# Patient Record
Sex: FEMALE | Race: WHITE
[De-identification: names, ages, dates, MRNs, and addresses within clinical notes are randomized per-mention and may not be internally consistent; named-entity substitution may affect disease eponyms.]

---

## 2017-05-03 ENCOUNTER — HOSPITAL ENCOUNTER (INPATIENT)
Dept: HOSPITAL 17 - HOBED | Age: 37
LOS: 3 days | Discharge: HOME | End: 2017-05-06
Attending: OBSTETRICS & GYNECOLOGY | Admitting: OBSTETRICS & GYNECOLOGY
Payer: MEDICAID

## 2017-05-03 VITALS — RESPIRATION RATE: 18 BRPM

## 2017-05-03 DIAGNOSIS — B96.20: ICD-10-CM

## 2017-05-03 DIAGNOSIS — O32.4XX0: ICD-10-CM

## 2017-05-03 DIAGNOSIS — O34.211: Primary | ICD-10-CM

## 2017-05-03 DIAGNOSIS — Z3A.37: ICD-10-CM

## 2017-05-03 LAB
BASOPHILS # BLD AUTO: 0.1 TH/MM3 (ref 0–0.2)
BASOPHILS NFR BLD: 0.6 % (ref 0–2)
EOSINOPHIL # BLD: 0.2 TH/MM3 (ref 0–0.4)
EOSINOPHIL NFR BLD: 1.2 % (ref 0–4)
ERYTHROCYTE [DISTWIDTH] IN BLOOD BY AUTOMATED COUNT: 14.9 % (ref 11.6–17.2)
HCT VFR BLD CALC: 37.8 % (ref 35–46)
HEMO FLAGS: (no result)
LYMPHOCYTES # BLD AUTO: 2.4 TH/MM3 (ref 1–4.8)
LYMPHOCYTES NFR BLD AUTO: 14.7 % (ref 9–44)
MCH RBC QN AUTO: 24.8 PG (ref 27–34)
MCHC RBC AUTO-ENTMCNC: 32.8 % (ref 32–36)
MCV RBC AUTO: 75.8 FL (ref 80–100)
MONOCYTES NFR BLD: 4.8 % (ref 0–8)
NEUTROPHILS # BLD AUTO: 13.1 TH/MM3 (ref 1.8–7.7)
NEUTROPHILS NFR BLD AUTO: 78.7 % (ref 16–70)
PLATELET # BLD: 193 TH/MM3 (ref 150–450)
RBC # BLD AUTO: 4.99 MIL/MM3 (ref 4–5.3)
WBC # BLD AUTO: 16.6 TH/MM3 (ref 4–11)

## 2017-05-03 PROCEDURE — 86703 HIV-1/HIV-2 1 RESULT ANTBDY: CPT

## 2017-05-03 PROCEDURE — 99285 EMERGENCY DEPT VISIT HI MDM: CPT

## 2017-05-03 PROCEDURE — 87186 SC STD MICRODIL/AGAR DIL: CPT

## 2017-05-03 PROCEDURE — 59025 FETAL NON-STRESS TEST: CPT

## 2017-05-03 PROCEDURE — 87077 CULTURE AEROBIC IDENTIFY: CPT

## 2017-05-03 PROCEDURE — 90715 TDAP VACCINE 7 YRS/> IM: CPT

## 2017-05-03 PROCEDURE — 85025 COMPLETE CBC W/AUTO DIFF WBC: CPT

## 2017-05-03 PROCEDURE — 86850 RBC ANTIBODY SCREEN: CPT

## 2017-05-03 PROCEDURE — 80307 DRUG TEST PRSMV CHEM ANLYZR: CPT

## 2017-05-03 PROCEDURE — 81001 URINALYSIS AUTO W/SCOPE: CPT

## 2017-05-03 PROCEDURE — 87086 URINE CULTURE/COLONY COUNT: CPT

## 2017-05-03 PROCEDURE — 86762 RUBELLA ANTIBODY: CPT

## 2017-05-03 PROCEDURE — 86592 SYPHILIS TEST NON-TREP QUAL: CPT

## 2017-05-03 PROCEDURE — 80048 BASIC METABOLIC PNL TOTAL CA: CPT

## 2017-05-03 PROCEDURE — 86900 BLOOD TYPING SEROLOGIC ABO: CPT

## 2017-05-03 PROCEDURE — 86901 BLOOD TYPING SEROLOGIC RH(D): CPT

## 2017-05-03 PROCEDURE — 80074 ACUTE HEPATITIS PANEL: CPT

## 2017-05-03 NOTE — PD
HPI


Chief Complaint


Contractions


Date Seen:  May 3, 2017


Time Seen:  23:00


Travel History


International Travel<30 Days:  No


Contact w/Intl Traveler<30Days:  No


Known Affected Area:  No





History of Present Illness


HPI


Patient is a 37-year-old  who is at 37 weeks gestation by an unsure due 

date of May 24.  She saw Dr. tab by mouth for 2 visits but then never return 

for continued prenatal care she is not had an ultrasound in the past.  Patient 

has had a previous  section  for a breech presentation.


Para:  1


:  3


Miscarriage:  1





History


Past Medical History


Medical History:  Denies Significant Hx





Obstetric History


Obstetric History


 section in 


LEEP


D&C





Family History


Family History:  Negative





Social History


Alcohol Use:  No


Tobacco Use:  No


Substance Abuse:  No





Allergies-Medications


(Allergen,Severity, Reaction):  


Coded Allergies:  


     Adhesives (Verified  Allergy, Mild, 3/30/16)


Home Meds


Active Scripts


Sulfamethoxazole-Trimethoprim DS (Bactrim DS)1 Tab Tab1 Tab PO BID  #6 TAB


   Prov:Daina Loja MD         3/31/16


Sulfamethoxazole-Trimethoprim DS (Bactrim DS)1 Tab Tab1 Tab PO BID  7 Days


   Prov:Kiko Avendaño MD         3/23/16


Reported Medications


Hydroxyzine Pamoate (Vistaril)50 Mg Cap50 Mg PO Q6H PRN (ANXIETY)


   13


Sertraline HCl (Zoloft)50 Mg Key589 Mg PO DAILY 


   13


Trazodone HCl 100 Mg [id number] Mg PO HS  #45 TAB


   13


Divalproex  mg (Depakote  mg)500 Mg [id number] Mg PO BID 


   13





Review of Systems


Except as stated in HPI:  all other systems reviewed are Neg





Physical Exam


Narrative


GENERAL: Well-nourished, well-developed patient.


SKIN: Warm and dry.


HEAD: Normocephalic and atraumatic.


EYES: No scleral icterus. No injection or drainage. 


ENT: No nasal drainage noted. Mucous membranes pink. Airway patent.


NECK: Supple, trachea midline. No JVD.


CARDIOVASCULAR: Regular rate and rhythm without murmurs, gallops, or rubs. 


RESPIRATORY: Breath sounds equal bilaterally. No accessory muscle use.


BREASTS: Bilateral exam showed no masses , no retractions, no nipple discharge.


ABDOMEN/GI: Abdomen soft, non-tender, bowel sounds present, no rebound, no 

guarding 


   Gravid to [37-] weeks size


   Fundal Height: [-]


GENITOURINARY: 


   External Genitalia: intact and normal in appearance


   BUS glands: [-Normal]


   Cervix: [-]


   Dilatation: [8-]          


   Effacement: [-80]          


   Station: [-2]  


   Presentation: [-Vertex]        


   Membranes: [intact or ruptured] intact


   Uterine Contractions: [-] Every 5 minutes


FHT's: 


   Category: [1-]   


   Baseline: [-140]   


   Reactive: [Moderate-]   


   Variability: [-] Moderate  


   Decels: [-Absent]  


EXTREMITIES: No cyanosis or edema.


BACK: Nontender without obvious deformity. No CVA tenderness.


NEUROLOGICAL: Awake and alert. Motor and sensory grossly within normal limits. 

Five out of 5 muscle strength in all muscle groups. Normal speech.





Data


Data


Vital Signs Reviewed:  Yes





MDM


Plan


37-year-old  previous  section unknown incision type however it is 

most likely a low transverse as it was performed for a breech presentation at 

term


Discussed with patient the pros and cons of repeat  section versus 

vaginal delivery as she is a already 8 cm.


Patient understands 1% risk of uterine rupture with a previous  section


Diagnosis


Diagnosis:  


 Primary Impression:  


 37 weeks gestation of pregnancy


 Additional Impressions:  


 No prenatal care in current pregnancy


 Mother's group B Streptococcus colonization status unknown


 History of substance abuse








La Dumont MD May 3, 2017 23:05

## 2017-05-04 VITALS — OXYGEN SATURATION: 97 % | HEART RATE: 92 BPM

## 2017-05-04 VITALS — DIASTOLIC BLOOD PRESSURE: 76 MMHG | SYSTOLIC BLOOD PRESSURE: 153 MMHG | HEART RATE: 98 BPM

## 2017-05-04 VITALS — OXYGEN SATURATION: 98 % | HEART RATE: 96 BPM

## 2017-05-04 VITALS
OXYGEN SATURATION: 100 % | DIASTOLIC BLOOD PRESSURE: 90 MMHG | HEART RATE: 120 BPM | RESPIRATION RATE: 18 BRPM | SYSTOLIC BLOOD PRESSURE: 153 MMHG

## 2017-05-04 VITALS — OXYGEN SATURATION: 97 % | HEART RATE: 114 BPM

## 2017-05-04 VITALS — HEART RATE: 89 BPM | DIASTOLIC BLOOD PRESSURE: 66 MMHG | OXYGEN SATURATION: 97 % | SYSTOLIC BLOOD PRESSURE: 122 MMHG

## 2017-05-04 VITALS — OXYGEN SATURATION: 98 % | HEART RATE: 111 BPM

## 2017-05-04 VITALS — HEART RATE: 114 BPM | OXYGEN SATURATION: 98 %

## 2017-05-04 VITALS — HEART RATE: 108 BPM | SYSTOLIC BLOOD PRESSURE: 144 MMHG | OXYGEN SATURATION: 97 % | DIASTOLIC BLOOD PRESSURE: 77 MMHG

## 2017-05-04 VITALS — SYSTOLIC BLOOD PRESSURE: 162 MMHG | DIASTOLIC BLOOD PRESSURE: 89 MMHG | HEART RATE: 100 BPM

## 2017-05-04 VITALS — OXYGEN SATURATION: 98 % | HEART RATE: 115 BPM

## 2017-05-04 VITALS — HEART RATE: 99 BPM | SYSTOLIC BLOOD PRESSURE: 154 MMHG | DIASTOLIC BLOOD PRESSURE: 85 MMHG

## 2017-05-04 VITALS — SYSTOLIC BLOOD PRESSURE: 154 MMHG | HEART RATE: 115 BPM | OXYGEN SATURATION: 99 % | DIASTOLIC BLOOD PRESSURE: 86 MMHG

## 2017-05-04 VITALS — HEART RATE: 95 BPM | OXYGEN SATURATION: 96 %

## 2017-05-04 VITALS — OXYGEN SATURATION: 98 % | HEART RATE: 91 BPM

## 2017-05-04 VITALS — HEART RATE: 91 BPM | OXYGEN SATURATION: 97 %

## 2017-05-04 VITALS
RESPIRATION RATE: 18 BRPM | DIASTOLIC BLOOD PRESSURE: 82 MMHG | OXYGEN SATURATION: 100 % | HEART RATE: 110 BPM | SYSTOLIC BLOOD PRESSURE: 144 MMHG | TEMPERATURE: 97.8 F

## 2017-05-04 VITALS — OXYGEN SATURATION: 99 % | HEART RATE: 95 BPM

## 2017-05-04 VITALS — SYSTOLIC BLOOD PRESSURE: 117 MMHG | OXYGEN SATURATION: 98 % | DIASTOLIC BLOOD PRESSURE: 79 MMHG | HEART RATE: 110 BPM

## 2017-05-04 VITALS — OXYGEN SATURATION: 96 % | SYSTOLIC BLOOD PRESSURE: 152 MMHG | HEART RATE: 125 BPM | DIASTOLIC BLOOD PRESSURE: 98 MMHG

## 2017-05-04 VITALS — OXYGEN SATURATION: 99 % | HEART RATE: 108 BPM

## 2017-05-04 VITALS — HEART RATE: 99 BPM | OXYGEN SATURATION: 97 %

## 2017-05-04 VITALS — HEART RATE: 115 BPM | OXYGEN SATURATION: 98 %

## 2017-05-04 VITALS — HEART RATE: 109 BPM | OXYGEN SATURATION: 96 %

## 2017-05-04 VITALS — TEMPERATURE: 98.5 F | RESPIRATION RATE: 18 BRPM

## 2017-05-04 VITALS — OXYGEN SATURATION: 98 % | HEART RATE: 97 BPM

## 2017-05-04 VITALS — SYSTOLIC BLOOD PRESSURE: 136 MMHG | HEART RATE: 98 BPM | DIASTOLIC BLOOD PRESSURE: 68 MMHG | OXYGEN SATURATION: 96 %

## 2017-05-04 VITALS
HEART RATE: 105 BPM | SYSTOLIC BLOOD PRESSURE: 139 MMHG | OXYGEN SATURATION: 100 % | DIASTOLIC BLOOD PRESSURE: 77 MMHG | RESPIRATION RATE: 19 BRPM

## 2017-05-04 VITALS — OXYGEN SATURATION: 100 % | HEART RATE: 109 BPM

## 2017-05-04 VITALS — HEART RATE: 96 BPM | OXYGEN SATURATION: 100 %

## 2017-05-04 VITALS — SYSTOLIC BLOOD PRESSURE: 154 MMHG | DIASTOLIC BLOOD PRESSURE: 90 MMHG | HEART RATE: 107 BPM | OXYGEN SATURATION: 100 %

## 2017-05-04 VITALS — DIASTOLIC BLOOD PRESSURE: 87 MMHG | HEART RATE: 111 BPM | SYSTOLIC BLOOD PRESSURE: 148 MMHG | RESPIRATION RATE: 18 BRPM

## 2017-05-04 VITALS — HEART RATE: 97 BPM | OXYGEN SATURATION: 97 %

## 2017-05-04 VITALS — OXYGEN SATURATION: 100 % | HEART RATE: 112 BPM

## 2017-05-04 VITALS — OXYGEN SATURATION: 97 % | HEART RATE: 97 BPM

## 2017-05-04 VITALS — HEART RATE: 114 BPM | DIASTOLIC BLOOD PRESSURE: 70 MMHG | OXYGEN SATURATION: 99 % | SYSTOLIC BLOOD PRESSURE: 127 MMHG

## 2017-05-04 VITALS — HEART RATE: 97 BPM | OXYGEN SATURATION: 99 %

## 2017-05-04 VITALS — OXYGEN SATURATION: 100 % | HEART RATE: 95 BPM

## 2017-05-04 VITALS — HEART RATE: 76 BPM

## 2017-05-04 VITALS — OXYGEN SATURATION: 98 % | HEART RATE: 89 BPM

## 2017-05-04 VITALS — DIASTOLIC BLOOD PRESSURE: 81 MMHG | OXYGEN SATURATION: 99 % | SYSTOLIC BLOOD PRESSURE: 146 MMHG | HEART RATE: 107 BPM

## 2017-05-04 VITALS — HEART RATE: 92 BPM | OXYGEN SATURATION: 97 %

## 2017-05-04 VITALS — TEMPERATURE: 97.8 F | RESPIRATION RATE: 18 BRPM

## 2017-05-04 VITALS — HEART RATE: 110 BPM | OXYGEN SATURATION: 99 %

## 2017-05-04 VITALS — SYSTOLIC BLOOD PRESSURE: 138 MMHG | OXYGEN SATURATION: 100 % | DIASTOLIC BLOOD PRESSURE: 84 MMHG | HEART RATE: 106 BPM

## 2017-05-04 VITALS — HEART RATE: 112 BPM | OXYGEN SATURATION: 99 %

## 2017-05-04 VITALS — RESPIRATION RATE: 18 BRPM

## 2017-05-04 VITALS — OXYGEN SATURATION: 97 % | HEART RATE: 92 BPM | SYSTOLIC BLOOD PRESSURE: 130 MMHG | DIASTOLIC BLOOD PRESSURE: 78 MMHG

## 2017-05-04 VITALS — DIASTOLIC BLOOD PRESSURE: 76 MMHG | HEART RATE: 108 BPM | SYSTOLIC BLOOD PRESSURE: 139 MMHG

## 2017-05-04 VITALS — OXYGEN SATURATION: 99 % | HEART RATE: 129 BPM

## 2017-05-04 VITALS — OXYGEN SATURATION: 99 % | HEART RATE: 121 BPM

## 2017-05-04 VITALS — HEART RATE: 92 BPM | SYSTOLIC BLOOD PRESSURE: 124 MMHG | OXYGEN SATURATION: 98 % | DIASTOLIC BLOOD PRESSURE: 73 MMHG

## 2017-05-04 VITALS — HEART RATE: 104 BPM | OXYGEN SATURATION: 97 %

## 2017-05-04 VITALS — OXYGEN SATURATION: 96 % | HEART RATE: 98 BPM

## 2017-05-04 VITALS — HEART RATE: 100 BPM | OXYGEN SATURATION: 96 %

## 2017-05-04 VITALS — HEART RATE: 118 BPM | OXYGEN SATURATION: 100 % | DIASTOLIC BLOOD PRESSURE: 74 MMHG | SYSTOLIC BLOOD PRESSURE: 155 MMHG

## 2017-05-04 VITALS — OXYGEN SATURATION: 100 % | HEART RATE: 104 BPM

## 2017-05-04 VITALS — OXYGEN SATURATION: 100 % | HEART RATE: 118 BPM

## 2017-05-04 VITALS — HEART RATE: 101 BPM | OXYGEN SATURATION: 99 %

## 2017-05-04 VITALS — HEART RATE: 94 BPM | OXYGEN SATURATION: 98 %

## 2017-05-04 VITALS
RESPIRATION RATE: 18 BRPM | SYSTOLIC BLOOD PRESSURE: 153 MMHG | DIASTOLIC BLOOD PRESSURE: 88 MMHG | HEART RATE: 92 BPM | OXYGEN SATURATION: 93 %

## 2017-05-04 VITALS — OXYGEN SATURATION: 98 % | HEART RATE: 90 BPM

## 2017-05-04 VITALS — OXYGEN SATURATION: 96 % | HEART RATE: 100 BPM

## 2017-05-04 VITALS — HEART RATE: 111 BPM | SYSTOLIC BLOOD PRESSURE: 152 MMHG | DIASTOLIC BLOOD PRESSURE: 88 MMHG

## 2017-05-04 VITALS — OXYGEN SATURATION: 100 % | HEART RATE: 120 BPM

## 2017-05-04 VITALS — RESPIRATION RATE: 17 BRPM | TEMPERATURE: 98 F

## 2017-05-04 VITALS — OXYGEN SATURATION: 91 % | HEART RATE: 100 BPM

## 2017-05-04 VITALS — HEART RATE: 94 BPM | OXYGEN SATURATION: 97 %

## 2017-05-04 VITALS — HEART RATE: 95 BPM | OXYGEN SATURATION: 97 %

## 2017-05-04 VITALS — HEART RATE: 102 BPM | OXYGEN SATURATION: 97 %

## 2017-05-04 VITALS — HEART RATE: 98 BPM | OXYGEN SATURATION: 99 %

## 2017-05-04 VITALS — HEART RATE: 112 BPM | OXYGEN SATURATION: 98 %

## 2017-05-04 VITALS — HEART RATE: 90 BPM | OXYGEN SATURATION: 98 %

## 2017-05-04 VITALS — HEART RATE: 122 BPM | OXYGEN SATURATION: 100 %

## 2017-05-04 VITALS — HEART RATE: 94 BPM | DIASTOLIC BLOOD PRESSURE: 76 MMHG | OXYGEN SATURATION: 99 % | SYSTOLIC BLOOD PRESSURE: 137 MMHG

## 2017-05-04 VITALS — HEART RATE: 117 BPM | OXYGEN SATURATION: 100 %

## 2017-05-04 VITALS — HEART RATE: 90 BPM | OXYGEN SATURATION: 97 %

## 2017-05-04 VITALS — RESPIRATION RATE: 18 BRPM | HEART RATE: 106 BPM | OXYGEN SATURATION: 97 %

## 2017-05-04 VITALS — OXYGEN SATURATION: 99 % | HEART RATE: 103 BPM

## 2017-05-04 VITALS — DIASTOLIC BLOOD PRESSURE: 84 MMHG | HEART RATE: 110 BPM | SYSTOLIC BLOOD PRESSURE: 145 MMHG

## 2017-05-04 VITALS — HEART RATE: 109 BPM | OXYGEN SATURATION: 100 %

## 2017-05-04 VITALS — OXYGEN SATURATION: 97 % | HEART RATE: 111 BPM | DIASTOLIC BLOOD PRESSURE: 88 MMHG | SYSTOLIC BLOOD PRESSURE: 150 MMHG

## 2017-05-04 VITALS — HEART RATE: 115 BPM | OXYGEN SATURATION: 99 %

## 2017-05-04 VITALS
DIASTOLIC BLOOD PRESSURE: 76 MMHG | HEART RATE: 103 BPM | RESPIRATION RATE: 18 BRPM | OXYGEN SATURATION: 96 % | SYSTOLIC BLOOD PRESSURE: 139 MMHG

## 2017-05-04 VITALS — HEART RATE: 114 BPM | OXYGEN SATURATION: 100 %

## 2017-05-04 VITALS — HEART RATE: 95 BPM | OXYGEN SATURATION: 99 %

## 2017-05-04 VITALS — OXYGEN SATURATION: 97 % | HEART RATE: 90 BPM

## 2017-05-04 VITALS — HEART RATE: 106 BPM | OXYGEN SATURATION: 99 %

## 2017-05-04 VITALS — HEART RATE: 93 BPM | OXYGEN SATURATION: 98 %

## 2017-05-04 VITALS — OXYGEN SATURATION: 98 % | HEART RATE: 105 BPM

## 2017-05-04 VITALS — HEART RATE: 98 BPM | OXYGEN SATURATION: 100 %

## 2017-05-04 VITALS — HEART RATE: 99 BPM | OXYGEN SATURATION: 96 %

## 2017-05-04 VITALS — RESPIRATION RATE: 19 BRPM

## 2017-05-04 VITALS — SYSTOLIC BLOOD PRESSURE: 139 MMHG | DIASTOLIC BLOOD PRESSURE: 74 MMHG | HEART RATE: 102 BPM

## 2017-05-04 VITALS — DIASTOLIC BLOOD PRESSURE: 84 MMHG | HEART RATE: 101 BPM | SYSTOLIC BLOOD PRESSURE: 152 MMHG | OXYGEN SATURATION: 100 %

## 2017-05-04 VITALS — HEART RATE: 98 BPM | OXYGEN SATURATION: 96 %

## 2017-05-04 VITALS — OXYGEN SATURATION: 100 % | HEART RATE: 97 BPM

## 2017-05-04 VITALS — OXYGEN SATURATION: 100 % | HEART RATE: 106 BPM

## 2017-05-04 VITALS — HEART RATE: 96 BPM | OXYGEN SATURATION: 97 %

## 2017-05-04 VITALS — DIASTOLIC BLOOD PRESSURE: 84 MMHG | SYSTOLIC BLOOD PRESSURE: 152 MMHG | HEART RATE: 105 BPM | OXYGEN SATURATION: 99 %

## 2017-05-04 VITALS — HEART RATE: 92 BPM | DIASTOLIC BLOOD PRESSURE: 83 MMHG | SYSTOLIC BLOOD PRESSURE: 152 MMHG

## 2017-05-04 VITALS — OXYGEN SATURATION: 100 % | HEART RATE: 100 BPM

## 2017-05-04 VITALS — HEART RATE: 121 BPM | OXYGEN SATURATION: 98 %

## 2017-05-04 VITALS — OXYGEN SATURATION: 98 % | HEART RATE: 110 BPM

## 2017-05-04 VITALS — SYSTOLIC BLOOD PRESSURE: 152 MMHG | DIASTOLIC BLOOD PRESSURE: 77 MMHG | HEART RATE: 100 BPM

## 2017-05-04 VITALS — OXYGEN SATURATION: 97 % | HEART RATE: 101 BPM

## 2017-05-04 VITALS — OXYGEN SATURATION: 100 % | HEART RATE: 113 BPM

## 2017-05-04 VITALS — OXYGEN SATURATION: 100 %

## 2017-05-04 VITALS — SYSTOLIC BLOOD PRESSURE: 140 MMHG | HEART RATE: 96 BPM | DIASTOLIC BLOOD PRESSURE: 74 MMHG

## 2017-05-04 VITALS — OXYGEN SATURATION: 96 % | HEART RATE: 103 BPM

## 2017-05-04 VITALS — DIASTOLIC BLOOD PRESSURE: 70 MMHG | SYSTOLIC BLOOD PRESSURE: 121 MMHG | HEART RATE: 97 BPM

## 2017-05-04 VITALS — SYSTOLIC BLOOD PRESSURE: 151 MMHG | DIASTOLIC BLOOD PRESSURE: 92 MMHG | HEART RATE: 87 BPM

## 2017-05-04 VITALS — HEART RATE: 94 BPM | OXYGEN SATURATION: 99 %

## 2017-05-04 VITALS — DIASTOLIC BLOOD PRESSURE: 79 MMHG | SYSTOLIC BLOOD PRESSURE: 144 MMHG

## 2017-05-04 VITALS — HEART RATE: 104 BPM | OXYGEN SATURATION: 98 %

## 2017-05-04 VITALS — OXYGEN SATURATION: 100 % | HEART RATE: 115 BPM

## 2017-05-04 VITALS — OXYGEN SATURATION: 100 % | DIASTOLIC BLOOD PRESSURE: 95 MMHG | SYSTOLIC BLOOD PRESSURE: 159 MMHG | HEART RATE: 109 BPM

## 2017-05-04 VITALS — HEART RATE: 98 BPM | OXYGEN SATURATION: 98 %

## 2017-05-04 VITALS — OXYGEN SATURATION: 98 % | HEART RATE: 93 BPM

## 2017-05-04 VITALS — OXYGEN SATURATION: 97 %

## 2017-05-04 VITALS — HEART RATE: 95 BPM | OXYGEN SATURATION: 100 %

## 2017-05-04 VITALS — SYSTOLIC BLOOD PRESSURE: 155 MMHG | DIASTOLIC BLOOD PRESSURE: 97 MMHG

## 2017-05-04 VITALS — DIASTOLIC BLOOD PRESSURE: 93 MMHG | HEART RATE: 89 BPM | SYSTOLIC BLOOD PRESSURE: 151 MMHG

## 2017-05-04 VITALS — HEART RATE: 116 BPM | OXYGEN SATURATION: 97 %

## 2017-05-04 VITALS — OXYGEN SATURATION: 91 % | HEART RATE: 79 BPM

## 2017-05-04 VITALS — OXYGEN SATURATION: 100 % | HEART RATE: 114 BPM

## 2017-05-04 VITALS — HEART RATE: 102 BPM | OXYGEN SATURATION: 99 %

## 2017-05-04 VITALS — OXYGEN SATURATION: 96 % | HEART RATE: 99 BPM

## 2017-05-04 VITALS — OXYGEN SATURATION: 99 %

## 2017-05-04 VITALS — HEART RATE: 106 BPM | OXYGEN SATURATION: 97 %

## 2017-05-04 VITALS — RESPIRATION RATE: 17 BRPM

## 2017-05-04 VITALS — OXYGEN SATURATION: 100 % | HEART RATE: 105 BPM

## 2017-05-04 VITALS — OXYGEN SATURATION: 98 % | HEART RATE: 104 BPM

## 2017-05-04 LAB
AMPHETAMINE, URINE: (no result)
ANION GAP SERPL CALC-SCNC: 12 MEQ/L (ref 5–15)
BACTERIA #/AREA URNS HPF: (no result) /HPF
BARBITURATES, URINE: (no result)
BUN SERPL-MCNC: 8 MG/DL (ref 7–18)
CHLORIDE SERPL-SCNC: 102 MEQ/L (ref 98–107)
COCAINE UR-MCNC: (no result) NG/ML
COLOR UR: YELLOW
COMMENT (UR): (no result)
CULTURE IF INDICATED: (no result)
GFR SERPLBLD BASED ON 1.73 SQ M-ARVRAT: 85 ML/MIN (ref 89–?)
GLUCOSE UR STRIP-MCNC: (no result) MG/DL
HCO3 BLD-SCNC: 22.5 MEQ/L (ref 21–32)
HGB UR QL STRIP: (no result)
KETONES UR STRIP-MCNC: (no result) MG/DL
MUCOUS THREADS #/AREA URNS LPF: (no result) /LPF
NITRITE UR QL STRIP: (no result)
PLAT MORPH BLD: NORMAL
PLATELET BLD QL SMEAR: NORMAL
POTASSIUM SERPL-SCNC: 4.1 MEQ/L (ref 3.5–5.1)
RAPID PLASMA REAGIN SCREEN: (no result)
RBC MORPH BLD: NORMAL
RUBELLA STATUS: (no result)
RUBV IGG SER-ACNC: (no result) IU/ML (ref 10–500)
SCAN/DIFF: (no result)
SODIUM SERPL-SCNC: 136 MEQ/L (ref 136–145)
SP GR UR STRIP: 1.02 (ref 1–1.03)
SQUAMOUS #/AREA URNS HPF: 2 /HPF (ref 0–5)
TRANS CELLS #/AREA URNS HPF: <1 /HPF
URATE CRY #/AREA URNS HPF: (no result) /HPF

## 2017-05-04 RX ADMIN — SODIUM CITRATE AND CITRIC ACID MONOHYDRATE SCH ML: 500; 334 SOLUTION ORAL at 03:13

## 2017-05-04 RX ADMIN — SODIUM CITRATE AND CITRIC ACID MONOHYDRATE SCH ML: 500; 334 SOLUTION ORAL at 03:58

## 2017-05-04 NOTE — MP
cc:

TANA WEBSTER M.D.

****

 

 

DATE OF SURGERY

2017

 

PREOPERATIVE DIAGNOSES

1. History of substance abuse.

2. 37 weeks gestation.

3. No prenatal care.

4. GBS unknown.

5. Previous  section.

6. Failure to descend.

 

POSTOPERATIVE DIAGNOSES

1. History of substance abuse.

2. 37 weeks gestation.

3. No prenatal care.

4. GBS unknown.

5. Previous  section.

6. Failure to descend.

 

PROCEDURE

Repeat low transverse  section without extension.

 

ESTIMATED BLOOD LOSS

600 cc

 

ANESTHETIC

Epidural.

 

MEDICATIONS

Ancef given 2 grams preoperatively.

 

DRAINS

Hicks to gravity.

 

COUNTS

Correct x 3.

 

FINDINGS

1. Normal uterus, tubes, ovaries.

2. Clear amniotic fluid.

3. Infant female, vertex presentation.  Weight was 6 pounds, 15 ounces at 3150

     grams, born at 04:32.  Apgars were 8 and 9.

 

DESCRIPTION OF PROCEDURE

The patient was taken back to the operating room, prepped and draped in the

usual sterile fashion, placed in dorsal supine position with a wedge to her

left side.

 

After adequate anesthetic was administered, a Pfannenstiel incision was made

through her old scar and taken down to the fascia.  The fascia was nicked in

the midline and extended bilaterally, then taken off the rectus muscles.   The

muscles were divided in the midline.  The anterior peritoneum was entered.  The

vesicouterine peritoneum was taken down and a transverse hysterotomy incision

was made, bluntly extended bilaterally.  The infant's head was delivered to the

operative field, the rest of the body was delivered and handed off to the

resuscitation team after a 45-second cord clamping delay.  The placenta was

delivered intact spontaneously and the endometrial cavity was curetted with a

moistened laparotomy sponge.

 

The  hysterotomy incision was repaired using a running locking #1 chromic

suture with good hemostasis at repair.  The gutters were rendered free of all

blood and clot material.  The fascia was closed with a #1 PDS in a running

fashion.  The subcutaneous tissue was made hemostatic with a Bovie.  The skin

was closed with staples.

 

The patient tolerated the procedure well.  She was taken back to the recovery

room good condition.

 

 

                              _________________________________

                              MD CHIQUI Rodriguez/CORNELIUS

D:  2017/4:59 AM

T:  :54 AM

Visit #:  [id number]

Job #:  [id number]

## 2017-05-04 NOTE — PD.OB.DELI
Procedure Note


 Section Procedure


Pre Op Diagnosis:  


(1) History of substance abuse


(2) 37 weeks gestation of pregnancy


(3) Mother's group B Streptococcus colonization status unknown


(4) No prenatal care in current pregnancy


Post Op Diagnosis:  


Performed by


La Dumont


Procedure:  Repeat Low Transverse  Sec


Indication for delivery:  Desired elective repeat , Other (failure to 

descend)


Confirmed correct:  Patient, Procedure, Site, Time-out taken


Anesthesia:  Epidural


Medication prior to procedure:  Antacids, Antibiotics, IV


Urinary catheter:  Inserted using sterile technique


Sterile preparation:  Duraprep


Position:  Supine with wedge to left side





Operative Features


Skin Incision:  Pfannenstiel


Uterine Incision:  Low transverse w/knife / blunt ext


Membranes Ruptured:  Previously


Presentation:  Occiput anterior


Time of birth:  04:32


Delivery of infant:  Uneventful


Infant:  Female


One Minute APGAR:  8


Five Minute APGAR:  9


Birth Weight:  3150gms, 6#15oz


Status of infant:  Viable


Placenta delivered:  Intact


Estimated blood loss:  600cc


Procedure tolerated:  Well


Maternal Condition:  Stable


Fetal Condition:  Stable








La Dumont MD May 4, 2017 04:53

## 2017-05-04 NOTE — HHI.PR
OB/GYN Note


Note


Patient s/p epidural.  Cervix 8/c/-2, arom clear


, reactive, mod variability,category 1 tracing.








La Dumont MD May 4, 2017 02:34

## 2017-05-05 VITALS
HEART RATE: 86 BPM | SYSTOLIC BLOOD PRESSURE: 131 MMHG | TEMPERATURE: 98 F | DIASTOLIC BLOOD PRESSURE: 71 MMHG | RESPIRATION RATE: 18 BRPM

## 2017-05-05 VITALS — DIASTOLIC BLOOD PRESSURE: 70 MMHG | SYSTOLIC BLOOD PRESSURE: 145 MMHG | HEART RATE: 102 BPM

## 2017-05-05 VITALS — SYSTOLIC BLOOD PRESSURE: 119 MMHG | DIASTOLIC BLOOD PRESSURE: 70 MMHG | HEART RATE: 82 BPM

## 2017-05-05 VITALS
TEMPERATURE: 99 F | HEART RATE: 125 BPM | RESPIRATION RATE: 18 BRPM | SYSTOLIC BLOOD PRESSURE: 159 MMHG | DIASTOLIC BLOOD PRESSURE: 87 MMHG

## 2017-05-05 VITALS — SYSTOLIC BLOOD PRESSURE: 125 MMHG | DIASTOLIC BLOOD PRESSURE: 73 MMHG | HEART RATE: 81 BPM

## 2017-05-05 VITALS
DIASTOLIC BLOOD PRESSURE: 85 MMHG | TEMPERATURE: 98.5 F | SYSTOLIC BLOOD PRESSURE: 148 MMHG | RESPIRATION RATE: 18 BRPM | HEART RATE: 92 BPM | OXYGEN SATURATION: 100 %

## 2017-05-05 VITALS
HEART RATE: 106 BPM | DIASTOLIC BLOOD PRESSURE: 89 MMHG | RESPIRATION RATE: 18 BRPM | TEMPERATURE: 98.1 F | SYSTOLIC BLOOD PRESSURE: 157 MMHG

## 2017-05-05 VITALS — HEART RATE: 103 BPM | SYSTOLIC BLOOD PRESSURE: 155 MMHG | RESPIRATION RATE: 20 BRPM | DIASTOLIC BLOOD PRESSURE: 94 MMHG

## 2017-05-05 VITALS — HEART RATE: 77 BPM | SYSTOLIC BLOOD PRESSURE: 110 MMHG | DIASTOLIC BLOOD PRESSURE: 54 MMHG

## 2017-05-05 VITALS — HEART RATE: 98 BPM | SYSTOLIC BLOOD PRESSURE: 129 MMHG | DIASTOLIC BLOOD PRESSURE: 76 MMHG

## 2017-05-05 VITALS
SYSTOLIC BLOOD PRESSURE: 132 MMHG | DIASTOLIC BLOOD PRESSURE: 70 MMHG | HEART RATE: 104 BPM | RESPIRATION RATE: 18 BRPM | TEMPERATURE: 98.2 F

## 2017-05-05 VITALS — SYSTOLIC BLOOD PRESSURE: 112 MMHG | DIASTOLIC BLOOD PRESSURE: 50 MMHG | HEART RATE: 82 BPM

## 2017-05-05 VITALS
HEART RATE: 115 BPM | TEMPERATURE: 98.5 F | DIASTOLIC BLOOD PRESSURE: 90 MMHG | RESPIRATION RATE: 18 BRPM | SYSTOLIC BLOOD PRESSURE: 140 MMHG

## 2017-05-05 VITALS — RESPIRATION RATE: 20 BRPM | TEMPERATURE: 98.2 F

## 2017-05-05 VITALS — SYSTOLIC BLOOD PRESSURE: 128 MMHG | DIASTOLIC BLOOD PRESSURE: 68 MMHG | HEART RATE: 95 BPM

## 2017-05-05 RX ADMIN — IBUPROFEN PRN MG: 600 TABLET, FILM COATED ORAL at 17:04

## 2017-05-05 RX ADMIN — IBUPROFEN PRN MG: 600 TABLET, FILM COATED ORAL at 23:49

## 2017-05-05 RX ADMIN — OXYCODONE HYDROCHLORIDE AND ACETAMINOPHEN PRN TAB: 5; 325 TABLET ORAL at 21:07

## 2017-05-05 RX ADMIN — OXYCODONE HYDROCHLORIDE AND ACETAMINOPHEN PRN TAB: 5; 325 TABLET ORAL at 12:33

## 2017-05-05 RX ADMIN — IBUPROFEN PRN MG: 600 TABLET, FILM COATED ORAL at 08:52

## 2017-05-05 RX ADMIN — STANDARDIZED SENNA CONCENTRATE AND DOCUSATE SODIUM PRN TAB: 8.6; 5 TABLET, FILM COATED ORAL at 08:52

## 2017-05-05 RX ADMIN — OXYCODONE HYDROCHLORIDE AND ACETAMINOPHEN PRN TAB: 5; 325 TABLET ORAL at 17:04

## 2017-05-05 RX ADMIN — STANDARDIZED SENNA CONCENTRATE AND DOCUSATE SODIUM PRN TAB: 8.6; 5 TABLET, FILM COATED ORAL at 21:07

## 2017-05-05 NOTE — HHI.OB
Subjective


Post Operative Day:  1


Remarks


Postoperative day number 1. AFVSS overnight.  Pain well-controlled. Incision 

not draining. Decreased lochia.  Denies dysuria.  No breast tenderness.  

Appetite good.  No nausea or vomiting. Endorses flatus. No bowel movement.  

Ambulating well.  Denies calf pain, shortness of breath, or cough.  Otherwise, 

she is doing well this morning and has no other complaints.





Objective


Vitals/I&O





Vital Signs








  Date Time  Temp Pulse Resp B/P Pulse Ox O2 Delivery O2 Flow Rate FiO2


 


5/5/17 08:35   20     


 


5/5/17 08:35 98.2       


 


5/5/17 08:00  81  125/73    


 


5/5/17 07:00  77  110/54    


 


5/5/17 06:00  82  119/70    


 


5/5/17 05:00  82  112/50    


 


5/5/17 04:00 98.0 86  131/71    


 


5/5/17 04:00   18     


 


5/5/17 03:00  95  128/68    


 


5/5/17 02:00  98  129/76    


 


5/5/17 01:00  102  145/70    


 


5/5/17 00:00 98.2  18     


 


5/5/17 00:00  104  132/70    


 


5/4/17 23:00  96  140/74    


 


5/4/17 22:00  108  139/76    


 


5/4/17 21:30  114   100   


 


5/4/17 21:20  117   100   


 


5/4/17 21:15  121   99   


 


5/4/17 21:10  101   99   


 


5/4/17 21:05  106   100   


 


5/4/17 21:00  107      


 


5/4/17 21:00  109  146/81 99   


 


5/4/17 20:55  115   100   


 


5/4/17 20:50  118   100   


 


5/4/17 20:45  122   100   


 


5/4/17 20:40  113   100   


 


5/4/17 20:35  114   100   


 


5/4/17 20:30  104   100   


 


5/4/17 20:25  112   100   


 


5/4/17 20:20  109   100   


 


5/4/17 20:15  104   100   


 


5/4/17 20:10  114   100   


 


5/4/17 20:05  120   100   


 


5/4/17 20:00  123      


 


5/4/17 20:00  118  155/74 100   


 


5/4/17 19:55  109   100   


 


5/4/17 19:50  95   100   


 


5/4/17 19:45  97   100   


 


5/4/17 19:40  98   100   


 


5/4/17 19:35  95   100   


 


5/4/17 19:30  96   100   


 


5/4/17 19:25  95   99   


 


5/4/17 19:20  96   98   


 


5/4/17 19:15  93   98   


 


5/4/17 19:10  94   98   


 


5/4/17 19:05  97   98   


 


5/4/17 19:00  115      


 


5/4/17 19:00  106  138/84 100   


 


5/4/17 18:59   18     


 


5/4/17 18:55  98   99   


 


5/4/17 18:50  95   99   


 


5/4/17 18:45  94   99   


 


5/4/17 18:40  96   98   


 


5/4/17 18:30  104   98   


 


5/4/17 18:10  98   98   


 


5/4/17 18:05  97   99   


 


5/4/17 18:00  99  139/77 100   


 


5/4/17 18:00   19     


 


5/4/17 18:00  105      


 


5/4/17 17:55  108   99   


 


5/4/17 17:50  96   98   


 


5/4/17 17:45  103   99   


 


5/4/17 17:40  104      


 


5/4/17 17:40     98   


 


5/4/17 17:35  115   99   


 


5/4/17 17:30  112   99   


 


5/4/17 17:25  111   98   


 


5/4/17 17:20  96   98   


 


5/4/17 17:15  91   98   


 


5/4/17 17:10  110   99   


 


5/4/17 17:05  114   98   


 


5/4/17 17:00  112  127/70 99   


 


5/4/17 17:00  114      


 


5/4/17 16:53   18     


 


5/4/17 16:50  90   98   


 


5/4/17 16:00  97  121/70    


 


5/4/17 15:10  121   98   


 


5/4/17 15:05  95   97   


 


5/4/17 15:00  110      


 


5/4/17 15:00  108  117/79 98   


 


5/4/17 14:55  106   97   


 


5/4/17 14:54   17     


 


5/4/17 14:50  95   97   


 


5/4/17 14:45  95   97   


 


5/4/17 14:40  92   97   


 


5/4/17 14:35  90   97   


 


5/4/17 14:30  105   98   


 


5/4/17 14:25  92   97   


 


5/4/17 14:20  91   97   


 


5/4/17 14:15  91   97   


 


5/4/17 14:10  90   97   


 


5/4/17 14:05  110   98   


 


5/4/17 14:00  90  122/66 97   


 


5/4/17 14:00  89      


 


5/4/17 13:41   18     


 


5/4/17 13:41   18     


 


5/4/17 13:40  96      


 


5/4/17 13:40     97   


 


5/4/17 13:40     97   


 


5/4/17 13:40  96      


 


5/4/17 13:35     97   


 


5/4/17 13:35  90      


 


5/4/17 13:35  90      


 


5/4/17 13:35     97   


 


5/4/17 13:30  95      


 


5/4/17 13:30  92  130/78    


 


5/4/17 13:30  95      


 


5/4/17 13:30     97   


 


5/4/17 13:30    130/78    


 


5/4/17 13:30     97   


 


5/4/17 13:25  94   97   


 


5/4/17 13:25  94   97   


 


5/4/17 13:20  93   98   


 


5/4/17 13:20  93   98   


 


5/4/17 13:15  94   98   


 


5/4/17 13:15  94   98   


 


5/4/17 13:10  95   99   


 


5/4/17 13:10  95   99   


 


5/4/17 13:05  89   98   


 


5/4/17 13:05  89   98   


 


5/4/17 13:00  89  137/76 99   


 


5/4/17 13:00  94      


 


5/4/17 13:00  89  137/76 99   


 


5/4/17 13:00  94      


 


5/4/17 12:55  91   98   


 


5/4/17 12:55  91   98   


 


5/4/17 12:50  90   98   


 


5/4/17 12:50  90   98   


 


5/4/17 12:45  90   97   


 


5/4/17 12:45  90   97   


 


5/4/17 12:40  93   98   


 


5/4/17 12:40  93   98   


 


5/4/17 12:35  93   98   


 


5/4/17 12:35  93   98   


 


5/4/17 12:30  93  124/73 98   


 


5/4/17 12:30  93  124/73 98   


 


5/4/17 12:30  92      


 


5/4/17 12:27   19     


 


5/4/17 12:20  102   97   


 


5/4/17 12:15  100   91   


 


5/4/17 12:05  112   98   


 


5/4/17 12:00  98      


 


5/4/17 12:00  95  136/68 96   


 


5/4/17 11:55  95   96   


 


5/4/17 11:50  97   97   


 


5/4/17 11:45  114   98   


 


5/4/17 11:40  97   97   


 


5/4/17 11:35  99   97   


 


5/4/17 11:30  104  144/77 97   


 


5/4/17 11:30  108      


 


5/4/17 11:25  103   96   


 


5/4/17 11:20  99   96   


 


5/4/17 11:15   18     


 


5/4/17 11:15  106   97   


 


5/4/17 11:10  99   96   


 


5/4/17 11:05  98   96   


 


5/4/17 11:00   18     


 


5/4/17 11:00  98      


 


5/4/17 11:00  103  139/76 96   


 


5/4/17 10:55  104   97   


 


5/4/17 10:50  98   96   


 


5/4/17 10:45  100   96   


 


5/4/17 10:40  100   96   


 


5/4/17 10:35  115   98   


 


5/4/17 10:30  102  139/74    


 


5/4/17 10:00  100  152/77    


 


5/4/17 09:30  98  153/76    


 


5/4/17 09:15  129   99   


 


5/4/17 09:10  79   91   


 


5/4/17 09:05  100   100   








Result Diagram:  


5/3/17 2320                                                                    

            5/4/17 0530





Objective Remarks


GENERAL: Well-nourished, well-developed patient.


CARDIOVASCULAR: Regular rate and rhythm without murmurs, gallops, or rubs. 


RESPIRATORY: Breath sounds equal bilaterally. No accessory muscle use.


ABDOMEN/GI: Abdomen soft, non-tender, bowel sounds present. 


   Incision: Clean, dry and intact.


   Fundus: Firm, non-tender at umbilicus.


GENITOURINARY: Light to moderate bleeding.


EXTREMITIES: No cyanosis or edema, non-tender, without signs of DVT.


Medications and IVs





 Current Medications








 Medications


  (Trade)  Dose


 Ordered  Sig/Ege


 Route  Start Time


 Stop Time Status Last Admin


 


  (NS Flush)  2 ml  UNSCH  PRN


 IV FLUSH  5/4/17 05:00


    5/5/17 08:51


 


 


  (Tylenol)  650 mg  Q4H  PRN


 PO  5/4/17 05:00


     


 


 


  (Motrin)  600 mg  Q6H  PRN


 PO  5/4/17 05:00


    5/5/17 08:52


 


 


  (Americaine 20%


 Top Spr)  1 spray  Q4H  PRN


 TOPICAL  5/4/17 05:00


     


 


 


  (Tucks Pads)  1 applic  QID  PRN


 TOPICAL  5/4/17 05:00


    5/4/17 16:27


 


 


  (Zahraa-Colace)  2 tab  Q12H  PRN


 PO  5/4/17 05:00


    5/5/17 08:52


 


 


  (Ambien)  5 mg  HS  PRN


 PO  5/4/17 05:00


     


 


 


  (Mag-Al Plus


 Susp Liq)  15 ml  Q8H  PRN


 PO  5/4/17 05:00


     


 


 


 Ondansetron HCl 4


 mg  4 mg  Q6H  PRN


 PO  5/4/17 05:00


     


 


 


  (Lr 1000 ml Inj)  1,000 ml @ 


 75 mls/hr  [id number]


 IV  5/4/17 18:45


    5/4/17 18:57


 











Assessment/Plan


Assessment and Plan


36y/o female who is POD#1 s/p CXN. 


-Continue routine postpartum care.


-Percocet and Motrin PRN pain.


-Encouraged OOB. Advised pelvic rest for 6 wks. Will need a f/u appt. in 1 wk 

for incision check. 


-Re: birth ctrl, she is undecided


-D/c in 1-2 more days.    





dw OB attending








Naman Miguel MD R1 May 5, 2017 09:07

## 2017-05-06 VITALS
HEART RATE: 106 BPM | RESPIRATION RATE: 18 BRPM | SYSTOLIC BLOOD PRESSURE: 136 MMHG | TEMPERATURE: 98.1 F | DIASTOLIC BLOOD PRESSURE: 79 MMHG

## 2017-05-06 VITALS
SYSTOLIC BLOOD PRESSURE: 147 MMHG | RESPIRATION RATE: 20 BRPM | DIASTOLIC BLOOD PRESSURE: 79 MMHG | TEMPERATURE: 98.3 F | HEART RATE: 97 BPM

## 2017-05-06 RX ADMIN — OXYCODONE HYDROCHLORIDE AND ACETAMINOPHEN PRN TAB: 5; 325 TABLET ORAL at 02:28

## 2017-05-06 RX ADMIN — IBUPROFEN PRN MG: 600 TABLET, FILM COATED ORAL at 05:59

## 2017-05-06 NOTE — HHI.DCPOC
Discharge Care Plan


Diagnosis:  


(1) Postpartum care following  delivery


Report Symptoms to Your Doctor


-Temperate above 100.5 degrees


-Redness, of incision or excessive or foul smelling drainage


-Unusual pain or calf pain


-Increased vaginal bleeding


-Painful or difficulty urinating


-Feelings of extreme sadness or anxiety after 2 weeks


Goals to Promote Your Health


* To prevent worsening of your condition and complications


* To maintain your health at the optimal level


Directions to Meet Your Goals


*** Take your medications as prescribed


*** Follow your dietary instruction


*** Follow activity as directed


*** Ensure plenty of rest for recovery


*** Drink fluids for hydration








*** Keep your appointments as scheduled


*** Take your immunizations and boosters as scheduled


*** If your symptoms worsen call your PCP, if no PCP go to Urgent Care Center 

or Emergency Room***


*** Smoking is Dangerous to Your Health. Avoid second hand smoke***


***Call the 24-hour crisis hotline for domestic abuse at 1-136.263.6794***








Sky Larose MD R2 May 6, 2017 09:08

## 2017-05-06 NOTE — HHI.OB
Subjective


Post Operative Day:  2


Remarks


36 yo  who is POD 2 from CS  at 0432.Patient with improved BP overnight; 

SBP in 130's.  Pain well-controlled.  Decreased lochia.  Denies dysuria.  No 

breast tenderness.  Appetite good.  Patient passing gas normally. Ambulating 

well.  No shortness of breath or leg swelling. Patient states that she still 

needs to establish follow-up at this time for post-partum care. (Sky Larose MD R2)





Objective


Vitals/I&O





Vital Signs








  Date Time  Temp Pulse Resp B/P Pulse Ox O2 Delivery O2 Flow Rate FiO2


 


17 03:20 98.1 106 18 136/79    


 


17 23:50 98.1       


 


17 23:50  106 18 157/89    


 


17 20:00  92  148/85    


 


17 20:00 98.5  18  100   


 


17 17:14  103 20 155/94    


 


17 15:18 99.0 125 18 159/87    


 


17 12:13 98.5 115 18 140/90    





 (Sky Larose MD R2)


Result Diagram:  


5/3/17 2320                                                                    

            17 0530





Objective Remarks


GENERAL: Well-nourished, well-developed patient.


CARDIOVASCULAR: Regular rate and rhythm without murmurs, gallops, or rubs. 


RESPIRATORY: Breath sounds equal bilaterally. No accessory muscle use.


ABDOMEN/GI: Abdomen soft, non-tender, bowel sounds present. 


   Incision: No concerns for drainage or infection


   Fundus: Firm, non-tender at umbilicus.


GENITOURINARY: Light to moderate bleeding.


EXTREMITIES: No cyanosis or edema, non-tender, without signs of DVT.


Medications and IVs





 Current Medications








 Medications


  (Trade)  Dose


 Ordered  Sig/Gee


 Route  Start Time


 Stop Time Status Last Admin


 


  (NS Flush)  2 ml  UNSCH  PRN


 IV FLUSH  17 05:00


    17 08:51


 


 


  (Tylenol)  650 mg  Q4H  PRN


 PO  17 05:00


     


 


 


  (Motrin)  600 mg  Q6H  PRN


 PO  17 05:00


    17 05:59


 


 


  (Americaine 20%


 Top Spr)  1 spray  Q4H  PRN


 TOPICAL  17 05:00


     


 


 


  (Tucks Pads)  1 applic  QID  PRN


 TOPICAL  17 05:00


    17 16:27


 


 


  (Zahraa-Colace)  2 tab  Q12H  PRN


 PO  17 05:00


    17 21:07


 


 


  (Ambien)  5 mg  HS  PRN


 PO  17 05:00


     


 


 


  (Mag-Al Plus


 Susp Liq)  15 ml  Q8H  PRN


 PO  17 05:00


    17 02:30


 


 


 Ondansetron HCl 4


 mg  4 mg  Q6H  PRN


 PO  17 05:00


     


 


 


  (Lr 1000 ml Inj)  1,000 ml @ 


 75 mls/hr  [id number]


 IV  17 18:45


    17 18:57


 


 


  (Percocet  5-325


 Mg)  1 tab  Q4H  PRN


 PO  17 12:15


     


 


 


  (Percocet  5-325


 Mg)  2 tab  Q4H  PRN


 PO  17 12:15


    17 02:28


 


 


  (Procardia)  10 mg  Q8HR


 PO  17 16:00


    17 05:59


 


 


  (Keflex)  500 mg  BID


 PO  17 09:00


     


 





 (Sky Larose MD R2)





Assessment/Plan


Assessment and Plan


38y/o female who is POD#2 s/p CXN. 


-Continue routine postpartum care.


-Percocet and Motrin PRN pain.


-Encouraged OOB. Advised pelvic rest for 6 wks. Will need a f/u appt. in 1 wk 

for incision check. 


-Re: birth ctrl, she is undecided


-D/c today 





UTI


Impression: Gram - harish on urine culture


-Will treat with Macrobid 100mg BID





Maternal substance abuse 


-DCF notified (Sky Larose MD R2)





Collaborating MD Comments


Agree with management plan for discharge. (La Dumont MD)








Sky Larose MD R2 May 6, 2017 09:07


La Dumont MD May 6, 2017 09:20

## 2018-04-16 ENCOUNTER — HOSPITAL ENCOUNTER (OUTPATIENT)
Dept: HOSPITAL 17 - HPND | Age: 38
End: 2018-04-16
Attending: OBSTETRICS & GYNECOLOGY
Payer: MEDICAID

## 2018-04-16 DIAGNOSIS — O99.333: ICD-10-CM

## 2018-04-16 DIAGNOSIS — O98.513: ICD-10-CM

## 2018-04-16 DIAGNOSIS — O34.211: Primary | ICD-10-CM

## 2018-04-16 DIAGNOSIS — O09.523: ICD-10-CM

## 2018-04-16 PROCEDURE — 76811 OB US DETAILED SNGL FETUS: CPT

## 2018-05-05 ENCOUNTER — HOSPITAL ENCOUNTER (INPATIENT)
Dept: HOSPITAL 17 - HOBED | Age: 38
LOS: 2 days | Discharge: HOME | End: 2018-05-07
Attending: OBSTETRICS & GYNECOLOGY | Admitting: OBSTETRICS & GYNECOLOGY
Payer: MEDICAID

## 2018-05-05 VITALS — HEART RATE: 97 BPM

## 2018-05-05 VITALS — SYSTOLIC BLOOD PRESSURE: 140 MMHG | DIASTOLIC BLOOD PRESSURE: 72 MMHG | HEART RATE: 90 BPM

## 2018-05-05 VITALS — HEART RATE: 102 BPM

## 2018-05-05 VITALS — HEART RATE: 122 BPM

## 2018-05-05 VITALS — DIASTOLIC BLOOD PRESSURE: 50 MMHG | HEART RATE: 95 BPM | SYSTOLIC BLOOD PRESSURE: 136 MMHG

## 2018-05-05 VITALS — DIASTOLIC BLOOD PRESSURE: 103 MMHG | OXYGEN SATURATION: 100 % | SYSTOLIC BLOOD PRESSURE: 163 MMHG | HEART RATE: 98 BPM

## 2018-05-05 VITALS — HEART RATE: 104 BPM

## 2018-05-05 VITALS — HEART RATE: 106 BPM

## 2018-05-05 VITALS — HEART RATE: 95 BPM

## 2018-05-05 VITALS — BODY MASS INDEX: 34.19 KG/M2 | HEIGHT: 60 IN | WEIGHT: 174.17 LBS

## 2018-05-05 VITALS — HEART RATE: 101 BPM | SYSTOLIC BLOOD PRESSURE: 129 MMHG | DIASTOLIC BLOOD PRESSURE: 67 MMHG

## 2018-05-05 VITALS — HEART RATE: 108 BPM

## 2018-05-05 VITALS — DIASTOLIC BLOOD PRESSURE: 80 MMHG | HEART RATE: 117 BPM | SYSTOLIC BLOOD PRESSURE: 180 MMHG

## 2018-05-05 VITALS — HEART RATE: 105 BPM

## 2018-05-05 VITALS — HEART RATE: 98 BPM | OXYGEN SATURATION: 97 %

## 2018-05-05 VITALS — DIASTOLIC BLOOD PRESSURE: 78 MMHG | SYSTOLIC BLOOD PRESSURE: 146 MMHG | HEART RATE: 114 BPM

## 2018-05-05 VITALS — HEART RATE: 112 BPM

## 2018-05-05 VITALS — HEART RATE: 103 BPM | OXYGEN SATURATION: 99 %

## 2018-05-05 VITALS — HEART RATE: 100 BPM

## 2018-05-05 VITALS — HEART RATE: 92 BPM

## 2018-05-05 VITALS — SYSTOLIC BLOOD PRESSURE: 150 MMHG | HEART RATE: 113 BPM | DIASTOLIC BLOOD PRESSURE: 74 MMHG

## 2018-05-05 VITALS — HEART RATE: 96 BPM | OXYGEN SATURATION: 98 % | SYSTOLIC BLOOD PRESSURE: 144 MMHG | DIASTOLIC BLOOD PRESSURE: 67 MMHG

## 2018-05-05 VITALS — DIASTOLIC BLOOD PRESSURE: 60 MMHG | SYSTOLIC BLOOD PRESSURE: 143 MMHG | HEART RATE: 102 BPM

## 2018-05-05 VITALS — HEART RATE: 117 BPM

## 2018-05-05 VITALS — HEART RATE: 111 BPM

## 2018-05-05 VITALS — HEART RATE: 97 BPM | OXYGEN SATURATION: 97 % | SYSTOLIC BLOOD PRESSURE: 134 MMHG | DIASTOLIC BLOOD PRESSURE: 62 MMHG

## 2018-05-05 VITALS — HEART RATE: 85 BPM

## 2018-05-05 VITALS — SYSTOLIC BLOOD PRESSURE: 132 MMHG | DIASTOLIC BLOOD PRESSURE: 67 MMHG | HEART RATE: 97 BPM

## 2018-05-05 VITALS — HEART RATE: 94 BPM

## 2018-05-05 VITALS — RESPIRATION RATE: 15 BRPM | TEMPERATURE: 98.1 F

## 2018-05-05 VITALS — HEART RATE: 109 BPM

## 2018-05-05 VITALS — SYSTOLIC BLOOD PRESSURE: 131 MMHG | HEART RATE: 93 BPM | DIASTOLIC BLOOD PRESSURE: 73 MMHG

## 2018-05-05 VITALS — HEART RATE: 103 BPM

## 2018-05-05 VITALS — HEART RATE: 116 BPM

## 2018-05-05 VITALS — HEART RATE: 113 BPM

## 2018-05-05 VITALS — HEART RATE: 118 BPM

## 2018-05-05 VITALS — HEART RATE: 99 BPM

## 2018-05-05 VITALS — HEART RATE: 96 BPM

## 2018-05-05 VITALS — HEART RATE: 93 BPM

## 2018-05-05 DIAGNOSIS — K21.9: ICD-10-CM

## 2018-05-05 DIAGNOSIS — B19.20: ICD-10-CM

## 2018-05-05 DIAGNOSIS — Z3A.38: ICD-10-CM

## 2018-05-05 DIAGNOSIS — F11.10: ICD-10-CM

## 2018-05-05 DIAGNOSIS — Z23: ICD-10-CM

## 2018-05-05 DIAGNOSIS — F15.10: ICD-10-CM

## 2018-05-05 LAB
BASOPHILS # BLD AUTO: 0.1 TH/MM3 (ref 0–0.2)
BASOPHILS NFR BLD: 0.5 % (ref 0–2)
COLOR UR: YELLOW
EOSINOPHIL # BLD: 0.2 TH/MM3 (ref 0–0.4)
EOSINOPHIL NFR BLD: 1.4 % (ref 0–4)
ERYTHROCYTE [DISTWIDTH] IN BLOOD BY AUTOMATED COUNT: 15.1 % (ref 11.6–17.2)
GLUCOSE UR STRIP-MCNC: (no result) MG/DL
HCT VFR BLD CALC: 39.1 % (ref 35–46)
HGB BLD-MCNC: 12.8 GM/DL (ref 11.6–15.3)
HGB UR QL STRIP: (no result)
KETONES UR STRIP-MCNC: (no result) MG/DL
LYMPHOCYTES # BLD AUTO: 2 TH/MM3 (ref 1–4.8)
LYMPHOCYTES NFR BLD AUTO: 13.3 % (ref 9–44)
MCH RBC QN AUTO: 24.6 PG (ref 27–34)
MCHC RBC AUTO-ENTMCNC: 32.8 % (ref 32–36)
MCV RBC AUTO: 74.9 FL (ref 80–100)
MONOCYTE #: 0.7 TH/MM3 (ref 0–0.9)
MONOCYTES NFR BLD: 4.8 % (ref 0–8)
NEUTROPHILS # BLD AUTO: 12.4 TH/MM3 (ref 1.8–7.7)
NEUTROPHILS NFR BLD AUTO: 80 % (ref 16–70)
NITRITE UR QL STRIP: (no result)
PLATELET # BLD: 225 TH/MM3 (ref 150–450)
PMV BLD AUTO: 9.2 FL (ref 7–11)
RBC # BLD AUTO: 5.22 MIL/MM3 (ref 4–5.3)
SP GR UR STRIP: 1.02 (ref 1–1.03)
URINE LEUKOCYTE ESTERASE: (no result)
WBC # BLD AUTO: 15.5 TH/MM3 (ref 4–11)

## 2018-05-05 PROCEDURE — 86901 BLOOD TYPING SEROLOGIC RH(D): CPT

## 2018-05-05 PROCEDURE — 90715 TDAP VACCINE 7 YRS/> IM: CPT

## 2018-05-05 PROCEDURE — 85025 COMPLETE CBC W/AUTO DIFF WBC: CPT

## 2018-05-05 PROCEDURE — 87150 DNA/RNA AMPLIFIED PROBE: CPT

## 2018-05-05 PROCEDURE — 86850 RBC ANTIBODY SCREEN: CPT

## 2018-05-05 PROCEDURE — 81001 URINALYSIS AUTO W/SCOPE: CPT

## 2018-05-05 PROCEDURE — 86592 SYPHILIS TEST NON-TREP QUAL: CPT

## 2018-05-05 PROCEDURE — 86900 BLOOD TYPING SEROLOGIC ABO: CPT

## 2018-05-05 PROCEDURE — 59025 FETAL NON-STRESS TEST: CPT

## 2018-05-05 PROCEDURE — 80074 ACUTE HEPATITIS PANEL: CPT

## 2018-05-05 PROCEDURE — 36415 COLL VENOUS BLD VENIPUNCTURE: CPT

## 2018-05-05 PROCEDURE — 3E0R3BZ INTRODUCTION OF ANESTHETIC AGENT INTO SPINAL CANAL, PERCUTANEOUS APPROACH: ICD-10-PCS | Performed by: OBSTETRICS & GYNECOLOGY

## 2018-05-05 PROCEDURE — 87081 CULTURE SCREEN ONLY: CPT

## 2018-05-05 PROCEDURE — G0481 DRUG TEST DEF 8-14 CLASSES: HCPCS

## 2018-05-05 PROCEDURE — 86703 HIV-1/HIV-2 1 RESULT ANTBDY: CPT

## 2018-05-05 PROCEDURE — 87591 N.GONORRHOEAE DNA AMP PROB: CPT

## 2018-05-05 PROCEDURE — 00HU33Z INSERTION OF INFUSION DEVICE INTO SPINAL CANAL, PERCUTANEOUS APPROACH: ICD-10-PCS | Performed by: OBSTETRICS & GYNECOLOGY

## 2018-05-05 PROCEDURE — 87491 CHLMYD TRACH DNA AMP PROBE: CPT

## 2018-05-05 PROCEDURE — 80307 DRUG TEST PRSMV CHEM ANLYZR: CPT

## 2018-05-05 RX ADMIN — OXYTOCIN SCH MLS/HR: 10 INJECTION, SOLUTION INTRAMUSCULAR; INTRAVENOUS at 20:13

## 2018-05-05 RX ADMIN — OXYTOCIN SCH MLS/HR: 10 INJECTION, SOLUTION INTRAMUSCULAR; INTRAVENOUS at 21:37

## 2018-05-05 NOTE — PD
HPI


Chief Complaint


Contractions


Date Seen:  May 5, 2018


Time Seen:  20:19


Travel History


International Travel<30 Days:  No


Contact w/Intl Traveler<30Days:  No


Known Affected Area:  No





History of Present Illness


HPI


38-year-old  4 para 2 AB 1 at 38+ weeks gestation based on 32 week 

ultrasound who presents with contractions.  She is uncertain of her water is 

broken.  She reports some bloody vaginal mucus.  Her cervix on arrival was 8 cm 

dilated 100% effaced -2 station.  The patient desires a  if possible.  She 

has had 2 prior C-sections.  The first was for breech the second was 1 year ago 

for arrest of active labor at 8 cm.  I reviewed with the patient the risks 

benefits and alternatives to trial of labor after 2 C-sections especially 

emphasizing the increased risk of uterine rupture and she desires trial of 

labor.


Para:  2


:  4


Miscarriage:  1


:  0





History


Past Medical History


*** Narrative Medical


History of substance abuse but the patient denies any use during this pregnancy.





History of hepatitis C.





Obstetric History


Obstetric History


Her first pregnancy she had a  for breech


Second pregnancy was a miscarriage with D&C in the first trimester


Third pregnancy was a term trial of labor which arrested at 8 cm dilation and 

subsequently was her second 





She has had very limited prenatal care with this pregnancy.  She had an 

ultrasound at 32 weeks which established her EDC.  She has had no prenatal labs.





Past Surgical History


*** Narrative Surgical


D&C, 2 C-sections





Family History


Family History:  Negative





Social History


Alcohol Use:  No


Tobacco Use:  Yes (10 cigarettes per day)


Substance Abuse:  Yes (History of opiate and amphetamine abuse)





Allergies-Medications


(Allergen,Severity, Reaction):  


Coded Allergies:  


     adhesive (Unverified  Allergy, Mild, 8/15/17)


Home Meds


Active Scripts


Nitrofurantoin Macrocrystal (Nitrofurantoin Macrocrystal) 100 Mg Cap, 100 MG PO 

BID for Infection, #14 CAP 0 Refills


   Prov:Sky Larose MD R3         17


Nifedipine (Procardia) 10 Mg Cap, 10 MG PO Q8HR, #60 CAP


   Prov:Sky Larose MD R3         17


Sennosides-Docusate Sodium (Senna Plus 8.6-50 mg) 1 Tab Tab, 2 TAB PO Q12H Y 

for CONSTIPATION, #30 TAB


   Prov:Sky Larose MD R3         17


Oxycodone-Acetaminophen (Oxycodone-Acetaminophen) 5-325 mg Tab, 1 TAB PO Q4H Y 

for PAIN SCALE 3 TO 5, #20 TAB


   Prov:Sky Larose MD R3         17


Ibuprofen (Ibuprofen) 600 Mg Tab, 600 MG PO Q6H Y for POSTPARTUM CRAMPING, #30 

TAB


   Prov:Sky Larose MD R3         17


Reported Medications


Hydroxyzine Pamoate (Vistaril) 50 Mg Cap, 50 MG PO Q6H Y for ANXIETY, CAP


   13


Sertraline HCl (Zoloft) 50 Mg Tab, 150 MG PO DAILY, TAB


   13


Trazodone HCl (Trazodone HCl) 100 Mg Tab, 150 MG PO HS, #45 TAB


   13


Divalproex  mg (Depakote  mg) 500 Mg Tab, 500 MG PO BID, TAB


   13





Review of Systems


Except as stated in HPI:  all other systems reviewed are Neg





Physical Exam


Narrative


GENERAL: Well-nourished, well-developed patient.  Poor dentition


SKIN: Warm and dry.


HEAD: Normocephalic and atraumatic.


EYES: No scleral icterus. No injection or drainage. 


ENT: No nasal drainage noted. Mucous membranes pink. Airway patent.


NECK: Supple, trachea midline. No JVD.


CARDIOVASCULAR: Regular rate and rhythm without murmurs, gallops, or rubs. 


RESPIRATORY: Breath sounds equal bilaterally. No accessory muscle use.


ABDOMEN/GI: Abdomen soft, non-tender, bowel sounds present, no rebound, no 

guarding 


   Gravid to [-] weeks size


   Fundal Height: [35-]


GENITOURINARY: 


   External Genitalia: intact and normal in appearance


   BUS glands: [Bloody show-]


   Cervix: [-]


   Dilatation: [-8]          


   Effacement: [100]          


   Station: [-2-]  


   Presentation: [-Vertex]        


   Membranes: [intact ]


   Uterine Contractions: [Every 2-3-]


FHT's: 


   Category: [1-]   


   Baseline: [-]   


   Reactive: [Yes-]   


   Variability: [Moderate-]  


   Decels: [-No]  


EXTREMITIES: No cyanosis or edema.


BACK: Nontender without obvious deformity. No CVA tenderness.


NEUROLOGICAL: Awake and alert. Motor and sensory grossly within normal limits. 

Five out of 5 muscle strength in all muscle groups. Normal speech.





Data


Data


Vital Signs Reviewed:  Yes


Orders





 Orders


Ob (2e) Additional Admit Info (18 19:57)


Admit To Inpatient (18 )


Code Status (18 20:08)


Vital Signs (Adult) .Per protocol (18 20:08)


Fetal Heart (18 20:08)


Amnioinfusion (18 20:08)


Urinary Catheter Management .ONCE (18 20:08)


Diet Npo (18 Breakfast)


Lactated Ringer's 1000 Ml Inj (Lr 1000 M (18 20:08)


Lactated Ringer's 1000 Ml Inj (Lr 1000 M (18 20:08)


Sodium Chlorid 0.9% 500 Ml Inj (Ns 500 M (18 20:15)


Sodium Chlor 0.9% 1000 Ml Inj (Ns 1000 M (18 20:28)


Lidocaine 1% Inj (50 Ml) (Xylocaine 1% I (18 20:15)


Citric Acid-Sodium Citrate Liq (Bicitra (18 20:15)


Ondansetron Inj (Zofran Inj) (18 20:15)


Fentanyl Inj (Fentanyl Inj) (18 20:15)


Fentanyl Inj (Fentanyl Inj) (18 20:15)


Complete Blood Count With Diff (18 20:08)


Hold Clot (18 20:08)


Abo/Rh Blood Type (18 20:08)


Urinalysis - C+S If Indicated (18 20:08)


Drug Screen, Random Urine (18 20:08)


Type And Screen (18 20:08)


Rapid Plasma Regin (Rpr) W Ttr (18 20:08)


Hepatitis Profile (18 20:08)


No Prenatal Care Spec Serology (18 20:08)


Resp Oxygen Non Rebreathe Mask (18 )


^ Epidural / Intrathecal Infus (18 20:08)


Oxytocin 30 Units-500ml Premix (Pitocin (18 20:15)


Lidocaine 1% Inj (50 Ml) (Xylocaine 1% I (18 20:15)


Light Mineral Oil (Muri-Lube Oil) (18 20:15)


Inpatient Certification (18 )


Gc And Chlamydia Pcr (18 20:15)


Group B Strep Pcr (Rapid) (18 20:18)


Group B Strep Pcr (Rapid) (18 20:18)





MDM


Medical Record Reviewed:  Yes


Narrative Course / MDM


Assessment: 38-year-old multiparous female with 2 prior C-sections now in 

active labor and desiring trial of labor, #2 Limited prenatal care with dating 

by 32 week ultrasound, #3 history of polysubstance abuse, history of hepatitis C

, #4 unknown GBS status





Plan: Prenatal labs, admit for labor management,


Condition:  Good











Salo Martin MD May 5, 2018 20:30

## 2018-05-05 NOTE — HHI.HP
History & Physical


H&P


 


Patient Name: Jazmin Mcgill Unit Number: [id number]


YOB: 1980 Patient Status: Registered Emergency Room


Attending Doctor: Salo Martin MD Account Number: [id number]


   


 HPI 


HPI


Chief Complaint


Contractions


Date Seen:  May 5, 2018


Time Seen:  20:19


Travel History


International Travel<30 Days:  No


Contact w/Intl Traveler<30Days:  No


Known Affected Area:  No





History of Present Illness


HPI


38-year-old  4 para 2 AB 1 at 38+ weeks gestation based on 32 week 

ultrasound who presents with contractions.  She is uncertain of her water is 

broken.  She reports some bloody vaginal mucus.  Her cervix on arrival was 8 cm 

dilated 100% effaced -2 station.  The patient desires a  if possible.  She 

has had 2 prior C-sections.  The first was for breech the second was 1 year ago 

for arrest of active labor at 8 cm.  I reviewed with the patient the risks 

benefits and alternatives to trial of labor after 2 C-sections especially 

emphasizing the increased risk of uterine rupture and she desires trial of 

labor.


Para:  2


:  4


Miscarriage:  1


:  0





 History (Limited) 


History


Past Medical History


*** Narrative Medical


History of substance abuse but the patient denies any use during this pregnancy.





History of hepatitis C.





Obstetric History


Obstetric History


Her first pregnancy she had a  for breech


Second pregnancy was a miscarriage with D&C in the first trimester


Third pregnancy was a term trial of labor which arrested at 8 cm dilation and 

subsequently was her second 





She has had very limited prenatal care with this pregnancy.  She had an 

ultrasound at 32 weeks which established her EDC.  She has had no prenatal labs.





Past Surgical History


*** Narrative Surgical


D&C, 2 C-sections





Family History


Family History:  Negative





Social History


Alcohol Use:  No


Tobacco Use:  Yes (10 cigarettes per day)


Substance Abuse:  Yes (History of opiate and amphetamine abuse)





 Allergies-Medications 


Allergies-Medications


(Allergen,Severity, Reaction):  


Coded Allergies:  


     adhesive (Unverified  Allergy, Mild, 8/15/17)


Home Meds


Active Scripts


Nitrofurantoin Macrocrystal (Nitrofurantoin Macrocrystal) 100 Mg Cap, 100 MG PO 

BID for Infection, #14 CAP 0 Refills


   Prov:Sky Larose MD R3         17


Nifedipine (Procardia) 10 Mg Cap, 10 MG PO Q8HR, #60 CAP


   Prov:Sky Larose MD R3         17


Sennosides-Docusate Sodium (Senna Plus 8.6-50 mg) 1 Tab Tab, 2 TAB PO Q12H Y 

for CONSTIPATION, #30 TAB


   Prov:Sky Larose MD R3         17


Oxycodone-Acetaminophen (Oxycodone-Acetaminophen) 5-325 mg Tab, 1 TAB PO Q4H Y 

for PAIN SCALE 3 TO 5, #20 TAB


   Prov:Sky Larose MD R3         17


Ibuprofen (Ibuprofen) 600 Mg Tab, 600 MG PO Q6H Y for POSTPARTUM CRAMPING, #30 

TAB


   Prov:Sky Larose MD R3         17


Reported Medications


Hydroxyzine Pamoate (Vistaril) 50 Mg Cap, 50 MG PO Q6H Y for ANXIETY, CAP


   13


Sertraline HCl (Zoloft) 50 Mg Tab, 150 MG PO DAILY, TAB


   13


Trazodone HCl (Trazodone HCl) 100 Mg Tab, 150 MG PO HS, #45 TAB


   13


Divalproex  mg (Depakote  mg) 500 Mg Tab, 500 MG PO BID, TAB


   13





 ROS 


Review of Systems


Except as stated in HPI:  all other systems reviewed are Neg





 Physical Exam 


Physical Exam


Narrative


GENERAL: Well-nourished, well-developed patient.  Poor dentition


SKIN: Warm and dry.


HEAD: Normocephalic and atraumatic.


EYES: No scleral icterus. No injection or drainage. 


ENT: No nasal drainage noted. Mucous membranes pink. Airway patent.


NECK: Supple, trachea midline. No JVD.


CARDIOVASCULAR: Regular rate and rhythm without murmurs, gallops, or rubs. 


RESPIRATORY: Breath sounds equal bilaterally. No accessory muscle use.


ABDOMEN/GI: Abdomen soft, non-tender, bowel sounds present, no rebound, no 

guarding 


   Gravid to [-] weeks size


   Fundal Height: [35-]


GENITOURINARY: 


   External Genitalia: intact and normal in appearance


   BUS glands: [Bloody show-]


   Cervix: [-]


   Dilatation: [-8]          


   Effacement: [100]          


   Station: [-2-]  


   Presentation: [-Vertex]        


   Membranes: [intact ]


   Uterine Contractions: [Every 2-3-]


FHT's: 


   Category: [1-]   


   Baseline: [-]   


   Reactive: [Yes-]   


   Variability: [Moderate-]  


   Decels: [-No]  


EXTREMITIES: No cyanosis or edema.


BACK: Nontender without obvious deformity. No CVA tenderness.


NEUROLOGICAL: Awake and alert. Motor and sensory grossly within normal limits. 

Five out of 5 muscle strength in all muscle groups. Normal speech.





 Data 


Vital Signs Reviewed:  Yes


Orders





 Orders


Ob (2e) Additional Admit Info (18 19:57)


Admit To Inpatient (18 )


Code Status (18 20:08)


Vital Signs (Adult) .Per protocol (18 20:08)


Fetal Heart (18 20:08)


Amnioinfusion (18 20:08)


Urinary Catheter Management .ONCE (18 20:08)


Diet Npo (18 Breakfast)


Lactated Ringer's 1000 Ml Inj (Lr 1000 M (18 20:08)


Lactated Ringer's 1000 Ml Inj (Lr 1000 M (18 20:08)


Sodium Chlorid 0.9% 500 Ml Inj (Ns 500 M (18 20:15)


Sodium Chlor 0.9% 1000 Ml Inj (Ns 1000 M (18 20:28)


Lidocaine 1% Inj (50 Ml) (Xylocaine 1% I (18 20:15)


Citric Acid-Sodium Citrate Liq (Bicitra (18 20:15)


Ondansetron Inj (Zofran Inj) (18 20:15)


Fentanyl Inj (Fentanyl Inj) (18 20:15)


Fentanyl Inj (Fentanyl Inj) (18 20:15)


Complete Blood Count With Diff (18 20:08)


Hold Clot (18 20:08)


Abo/Rh Blood Type (18 20:08)


Urinalysis - C+S If Indicated (18 20:08)


Drug Screen, Random Urine (18 20:08)


Type And Screen (18 20:08)


Rapid Plasma Regin (Rpr) W Ttr (18 20:08)


Hepatitis Profile (18 20:08)


No Prenatal Care Spec Serology (18 20:08)


Resp Oxygen Non Rebreathe Mask (18 )


^ Epidural / Intrathecal Infus (18 20:08)


Oxytocin 30 Units-500ml Premix (Pitocin (18 20:15)


Lidocaine 1% Inj (50 Ml) (Xylocaine 1% I (18 20:15)


Light Mineral Oil (Muri-Lube Oil) (18 20:15)


Inpatient Certification (18 )


Gc And Chlamydia Pcr (18 20:15)


Group B Strep Pcr (Rapid) (18 20:18)


Group B Strep Pcr (Rapid) (18 20:18)





 MDM 


MDM


Medical Record Reviewed:  Yes


Narrative Course / MDM


Assessment: 38-year-old multiparous female with 2 prior C-sections now in 

active labor and desiring trial of labor, #2 Limited prenatal care with dating 

by 32 week ultrasound, #3 history of polysubstance abuse, history of hepatitis C

, #4 unknown GBS status





Plan: Prenatal labs, admit for labor management,


Condition:  Good











Salo Martin MD May 5, 2018 20:30











Salo Martin MD May 5, 2018 20:35

## 2018-05-06 VITALS
DIASTOLIC BLOOD PRESSURE: 75 MMHG | SYSTOLIC BLOOD PRESSURE: 142 MMHG | TEMPERATURE: 98.5 F | RESPIRATION RATE: 16 BRPM | HEART RATE: 115 BPM

## 2018-05-06 VITALS — DIASTOLIC BLOOD PRESSURE: 79 MMHG | HEART RATE: 108 BPM | SYSTOLIC BLOOD PRESSURE: 141 MMHG

## 2018-05-06 VITALS — HEART RATE: 107 BPM

## 2018-05-06 VITALS — DIASTOLIC BLOOD PRESSURE: 67 MMHG | HEART RATE: 100 BPM | SYSTOLIC BLOOD PRESSURE: 141 MMHG

## 2018-05-06 VITALS — HEART RATE: 115 BPM

## 2018-05-06 VITALS — HEART RATE: 111 BPM

## 2018-05-06 VITALS
SYSTOLIC BLOOD PRESSURE: 130 MMHG | DIASTOLIC BLOOD PRESSURE: 83 MMHG | RESPIRATION RATE: 17 BRPM | TEMPERATURE: 98.6 F | HEART RATE: 111 BPM

## 2018-05-06 VITALS — SYSTOLIC BLOOD PRESSURE: 144 MMHG | DIASTOLIC BLOOD PRESSURE: 72 MMHG | HEART RATE: 114 BPM

## 2018-05-06 VITALS — HEART RATE: 113 BPM

## 2018-05-06 VITALS — DIASTOLIC BLOOD PRESSURE: 77 MMHG | HEART RATE: 112 BPM | SYSTOLIC BLOOD PRESSURE: 144 MMHG

## 2018-05-06 VITALS — HEART RATE: 107 BPM | DIASTOLIC BLOOD PRESSURE: 83 MMHG | SYSTOLIC BLOOD PRESSURE: 139 MMHG

## 2018-05-06 VITALS — SYSTOLIC BLOOD PRESSURE: 145 MMHG | HEART RATE: 108 BPM | DIASTOLIC BLOOD PRESSURE: 82 MMHG

## 2018-05-06 VITALS — DIASTOLIC BLOOD PRESSURE: 81 MMHG | SYSTOLIC BLOOD PRESSURE: 144 MMHG | HEART RATE: 103 BPM

## 2018-05-06 VITALS — HEART RATE: 109 BPM

## 2018-05-06 VITALS — HEART RATE: 103 BPM | SYSTOLIC BLOOD PRESSURE: 132 MMHG | DIASTOLIC BLOOD PRESSURE: 62 MMHG

## 2018-05-06 VITALS — SYSTOLIC BLOOD PRESSURE: 134 MMHG | HEART RATE: 103 BPM | DIASTOLIC BLOOD PRESSURE: 66 MMHG

## 2018-05-06 VITALS — HEART RATE: 120 BPM | SYSTOLIC BLOOD PRESSURE: 132 MMHG | DIASTOLIC BLOOD PRESSURE: 78 MMHG

## 2018-05-06 VITALS — SYSTOLIC BLOOD PRESSURE: 145 MMHG | HEART RATE: 105 BPM | DIASTOLIC BLOOD PRESSURE: 59 MMHG

## 2018-05-06 VITALS — HEART RATE: 113 BPM | SYSTOLIC BLOOD PRESSURE: 117 MMHG | DIASTOLIC BLOOD PRESSURE: 64 MMHG

## 2018-05-06 VITALS — RESPIRATION RATE: 17 BRPM | TEMPERATURE: 97.7 F

## 2018-05-06 VITALS — HEART RATE: 114 BPM

## 2018-05-06 VITALS — HEART RATE: 103 BPM

## 2018-05-06 VITALS — HEART RATE: 118 BPM

## 2018-05-06 VITALS — HEART RATE: 102 BPM | DIASTOLIC BLOOD PRESSURE: 66 MMHG | SYSTOLIC BLOOD PRESSURE: 140 MMHG

## 2018-05-06 VITALS — HEART RATE: 98 BPM

## 2018-05-06 VITALS — HEART RATE: 110 BPM

## 2018-05-06 VITALS — SYSTOLIC BLOOD PRESSURE: 142 MMHG | HEART RATE: 112 BPM | TEMPERATURE: 98.1 F | DIASTOLIC BLOOD PRESSURE: 90 MMHG

## 2018-05-06 VITALS — HEART RATE: 116 BPM

## 2018-05-06 VITALS — HEART RATE: 104 BPM

## 2018-05-06 VITALS — HEART RATE: 97 BPM

## 2018-05-06 VITALS — HEART RATE: 108 BPM

## 2018-05-06 VITALS — RESPIRATION RATE: 16 BRPM

## 2018-05-06 VITALS — HEART RATE: 95 BPM

## 2018-05-06 VITALS — HEART RATE: 100 BPM

## 2018-05-06 VITALS — HEART RATE: 123 BPM

## 2018-05-06 VITALS — HEART RATE: 99 BPM

## 2018-05-06 VITALS — HEART RATE: 112 BPM

## 2018-05-06 VITALS — HEART RATE: 106 BPM

## 2018-05-06 VITALS — HEART RATE: 105 BPM

## 2018-05-06 PROCEDURE — 0HQ9XZZ REPAIR PERINEUM SKIN, EXTERNAL APPROACH: ICD-10-PCS | Performed by: OBSTETRICS & GYNECOLOGY

## 2018-05-06 RX ADMIN — STANDARDIZED SENNA CONCENTRATE AND DOCUSATE SODIUM PRN TAB: 8.6; 5 TABLET, FILM COATED ORAL at 09:53

## 2018-05-06 RX ADMIN — FAMOTIDINE SCH MG: 20 TABLET, FILM COATED ORAL at 09:53

## 2018-05-06 RX ADMIN — FAMOTIDINE SCH MG: 20 TABLET, FILM COATED ORAL at 21:09

## 2018-05-06 RX ADMIN — IBUPROFEN PRN MG: 800 TABLET, FILM COATED ORAL at 05:19

## 2018-05-06 RX ADMIN — ACETAMINOPHEN PRN MG: 325 TABLET ORAL at 21:16

## 2018-05-06 RX ADMIN — STANDARDIZED SENNA CONCENTRATE AND DOCUSATE SODIUM PRN TAB: 8.6; 5 TABLET, FILM COATED ORAL at 21:15

## 2018-05-06 RX ADMIN — IBUPROFEN PRN MG: 800 TABLET, FILM COATED ORAL at 21:15

## 2018-05-06 NOTE — PD.OB.DELI
Weeks gestation:  38


Gest age assessed date:  May 5, 2018


Gest age assessed time:  20:00


Pt started active labor?:  Yes


Active labor start date:  May 5, 2018


Active labor start time:  20:00


Medical induction of labor?:  No


Artificial rupture of membrane:  Yes


Artificial ROM date:  May 6, 2018


Artifical ROM time:  02:00


Anesthesia:  Epidural


Episiotomy:  None


Vaginal Delivery:  Vacuum, 


Presentation:  Occiput anterior


Nuchal Cord:  None


Delayed cord clamping (45 sec):  Yes


Infant:  Male


Delivery date:  May 6, 2018


Delivery time:  03:21


One Minute APGAR:  8


Five Minute APGAR:  9


Placenta:  Spontaneous delivery, Intact, 3 vessel cord


Laceration:  Vaginal laceration, 1 deg


Repair:  Vicryl interrupted


Estimated blood loss:  150


Additional Information


The patient progressed to complete dilation.  The vertex descended to +3 

station and no further progress was achieved.  I discussed with the patient the 

option of proceeding to repeat  versus trial of operative delivery 

with the vacuum.  After reviewing the risks benefits and alternatives she 

consented to trial of vacuum.  The vacuum was attached to the flex point in 

with a single contraction the vertex delivered.  The cup was removed and the 

shoulders were delivered by maternal effort without delay.  The remainder the 

infant followed easily and the baby was passed to the maternal abdomen were 

delayed cord clamping was accomplished.  There were no complications.











Slao Martin MD May 6, 2018 03:42

## 2018-05-06 NOTE — HHI.OB
Subjective


Post Partum Day:  0


Remarks


Patient is a 38-year-old  delivered at 38 weeks and 2 days. Patient is 

postpartum day 0 after vacuum-assisted vaginal delivery. Patient's pain is well-

controlled. Patient reports eating and drinking without any nausea or vomiting. 

Patient reports minimal bleeding. Patient has not urinated, passed gas or bowel 

movements. Patient is walking without lower extremity pain or shortness of 

breath. Patient reports desire for contraception with Depo-Provera and formula-

feeding.  She complains of some reflux.


 (Landau,Michael A MD R2)


Remarks


Patient seen and evaluated with resident under direct supervision, agree with 

assessment and plan.


 (Salo Martin MD)





Objective


Vitals/I&O





Vital Signs








  Date Time  Temp Pulse Resp B/P (MAP) Pulse Ox O2 Delivery O2 Flow Rate FiO2


 


18 04:45  108  145/82 (103)    


 


18 04:31  113  117/64 (81)    


 


18 04:20  107  139/83 (101)    


 


18 03:49 97.7  17     


 


18 03:30  112  144/77 (99)    


 


18 03:20  109      


 


18 03:15  114      


 


18 03:15  105      


 


/ 03:10  109      


 


18 03:05  97      


 


18 03:01  114  144/72 (96)    


 


18 03:00  123      


 


18 02:55  109      


 


18 02:45  106      


 


18 02:40  114      


 


18 02:35  115      


 


18 02:30  113      


 


18 02:25  111      


 


18 02:20  99      


 


18 02:15  110      


 


18 02:10  97      


 


18 02:05  114      


 


18 02:00  112  142/90 (107)    


 


18 02:00 98.1       


 


18 01:59   16     


 


18 01:55  112      


 


18 01:50  111      


 


18 01:45    132/78 (96)    


 


18 01:45  120      


 


18 01:40  109      


 


18 01:35  116      


 


18 01:30  108      


 


18 01:30    141/79 (99)    


 


18 01:25  114      


 


18 01:20  107      


 


18 01:16  103  134/66 (88)    


 


18 01:15  111      


 


18 01:10  104      


 


18 01:05  118      


 


18 01:01  103  144/81 (102)    


 


18 01:00  108      


 


18 00:55  103      


 


18 00:50  105      


 


18 00:45  97  141/67 (91)    


 


18 00:45  100      


 


18 00:40  98      


 


18 00:35  103      


 


18 00:31  102  140/66 (90)    


 


18 00:30  112      


 


18 00:25  104      


 


18 00:20  113      


 


18 00:15  105      


 


18 00:15  98  145/59 (87)    


 


18 00:10  95      


 


18 00:05  100      


 


18 00:00  103  132/62 (85)    


 


18 00:00  101      


 


18 23:55  103      


 


18 23:50  104      


 


18 23:46  95  136/50 (78)    


 


18 23:45  106      


 


/ 23:30  93      


 


18 23:30  90  140/72 (94)    


 


18 23:25  94      


 


18 23:20  106      


 


18 23:15  96      


 


5/18 23:10  118      


 


18 23:05  103      


 


18 23:00  101      


 


18 23:00    129/67 (87)    


 


18 22:55  97      


 


5/ 22:53 98.1  15     


 


5 22:50  93      


 


5/18 22:45  100      


 


/18 22:40  97      


 


/18 22:35  105      


 


5/18 22:30  108      


 


5/ 22:30  106      


 


5/18 22:20  103      


 


5/18 22:15  92      


 


5/18 22:10  95      


 


18 22:05  99      


 


18 22:00  97      


 


18 22:00  96  132/67 (88)    


 


18 21:50  104      


 


18 21:46  114  146/78 (100)    


 


18 21:45  109      


 


18 21:40  104      


 


18 21:35  98   97   


 


18 21:30  97      


 


18 21:30  97  134/62 (86)    


 


18 21:30     97   


 


18 21:25  96      


 


18 21:25  97  144/67 (92)    


 


18 21:25  101   98   


 


18 21:20  93  131/73 (92)    


 


18 21:20  99      


 


18 21:16  102  143/60 (87)    


 


18 21:15  103      


 


18 21:15     99   


 


18 21:11  113  150/74 (99)    


 


18 21:10  122      


 


18 21:06  117  180/80 (113)    


 


18 21:05  106      


 


18 21:01  108      


 


18 21:00  98      


 


18 21:00  105      


 


18 21:00     100   


 


18 21:00    163/103 (123)    


 


18 20:55  102      


 


18 20:50  108      


 


18 20:45  94      


 


18 20:40  97      


 


18 20:35  85      


 


18 20:30  102      


 


18 20:25  112      


 


18 20:15  111      


 


18 20:10  116      


 


18 20:05  113      


 


18 20:00  117      


 


18 19:55  111      


 


18 19:50  113      


 


18 19:45  112      








Objective Remarks


GENERAL: Well-nourished, well-developed patient.


CARDIOVASCULAR: Regular rate and rhythm without murmurs, gallops, or rubs. 


RESPIRATORY: Breath sounds equal bilaterally. No accessory muscle use.


ABDOMEN/GI: Abdomen soft, non-tender. 


   Fundus: Firm, non-tender at umbilicus.


GENITOURINARY: Light to moderate bleeding.


EXTREMITIES: No cyanosis or edema, non-tender, without signs of DVT.


Medications and IVs





Current Medications








 Medications


  (Trade)  Dose


 Ordered  Sig/Gee


 Route  Start Time


 Stop Time Status Last Admin


 


  (Misc Nursing


 Information)  No systemic


 narcotics to be


 given except...  UNSCH  PRN


 .XX  18 21:30


 18 21:29   


 


 


  (Misc Nursing


 Information)  DO NOT


 ADMINISTER ANY


 ANTICOAGUL...  UNSCH  PRN


 .XX  18 21:30


 18 21:29   


 


 


  (NS Flush)  2 ml  BID


 IV FLUSH  18 09:00


     


 


 


  (NS Flush)  2 ml  UNSCH  PRN


 IV FLUSH  18 03:45


     


 


 


 Oxytocin  500 ml @ 


 100 mls/hr  CONTINUOUS


 IV  18 03:45


 18 08:44   


 


 


  (Tylenol)  650 mg  Q4H  PRN


 PO  18 03:45


     


 


 


  (Motrin)  800 mg  Q8H  PRN


 PO  18 03:45


    18 05:19


 


 


  (Americaine 20%


 Top Spr)  1 spray  Q4H  PRN


 TOPICAL  18 03:45


     


 


 


  (Tucks Pads)  1 applic  QID  PRN


 TOPICAL  18 03:45


     


 


 


  (Zahraa-Colace)  2 tab  Q12H  PRN


 PO  18 03:45


     


 


 


  (Ambien)  5 mg  HS  PRN


 PO  18 03:45


     


 


 


  (M-M-R Ii Inj)  0.5 ml  ONCE ONCE


 SQ  18 16:00


 18 16:01   


 


 


  (Boostrix Inj)  0.5 ml  ONCE ONCE


 IM  18 16:00


 18 16:01   


 


 


  (Mag-Al Plus


 Susp Liq)  15 ml  Q8H  PRN


 PO  18 03:45


     


 


 


  (Zofran  Odt)  4 mg  Q6H  PRN


 PO  18 03:45


     


 


 


  (Toradol Inj)  30 mg  Q6H  PRN


 IM  18 03:45


 18 03:44   


 








 (Landau,Michael A MD R2)





Assessment/Plan


Problem List:  


(1) Vacuum extraction, delivered, current hospitalization


ICD Codes:  O66.5 - Attempted application of vacuum extractor and forceps


Assessment and Plan


Patient with h/o drug abuse is a 38-year-old  delivered at 38 weeks and 2 

days. Patient is postpartum day 0 after vacuum-assisted vaginal delivery. 

Patient was counseled to do 6 weeks of pelvic rest. Patient was counseled to 

follow up in 6 weeks. Patient requested follow-up and contraception. 





1. s/p vacuum-assisted vaginal delivery


--AF VSS


--Continue routine postpartum care


--Motrin and Percocet when necessary for pain


--Encourage OOB


--Pelvic rest for 6 weeks will need follow-up appointment at that time. 


--Contraception: Depo-Provera prior to discharge


--Anticipate discharge tomorrow or the next day





2.  Acid reflux


--Famotidine 20 mg p.o. twice daily





3. h/o drug abuse -patient reported that she has been clean for the past 4 

years but was found to have UDS positive for amphetamines





w/d/w Dr. Martin


Discharge Planning


--Anticipate discharge tomorrow or the next day


 (Landau,Michael A MD R2)











Landau,Michael A MD R2 May 6, 2018 08:15


Salo Martin MD May 6, 2018 09:37

## 2018-05-07 VITALS — RESPIRATION RATE: 20 BRPM | TEMPERATURE: 97.9 F | HEART RATE: 93 BPM

## 2018-05-07 VITALS — SYSTOLIC BLOOD PRESSURE: 116 MMHG | DIASTOLIC BLOOD PRESSURE: 66 MMHG

## 2018-05-07 RX ADMIN — ACETAMINOPHEN PRN MG: 325 TABLET ORAL at 12:20

## 2018-05-07 RX ADMIN — FAMOTIDINE SCH MG: 20 TABLET, FILM COATED ORAL at 09:00

## 2018-05-07 RX ADMIN — IBUPROFEN PRN MG: 800 TABLET, FILM COATED ORAL at 12:20

## 2018-05-07 NOTE — HHI.OB
Subjective


Remarks


Patient is a 38-year-old  delivered at 38 weeks and 2 days. Patient is 

postpartum day 1 after vacuum-assisted vaginal delivery. Patient's pain is well-

controlled. Patient reports eating and drinking without any nausea or vomiting. 

Patient reports minimal bleeding. Patient has passed gas, but no bowel 

movements. Patient is walking without lower extremity pain or shortness of 

breath. Patient reports desire for contraception with Depo-Provera and formula-

feeding.





Objective


Vitals/I&O





Vital Signs








  Date Time  Temp Pulse Resp B/P (MAP) Pulse Ox O2 Delivery O2 Flow Rate FiO2


 


18 07:55    116/66 (83)    


 


18 07:54 97.9 93 20     


 


18 21:10 98.6 111 17 130/83 (99)    


 


18 08:49 98.5 115 16     


 


18 08:49    142/75 (97)    








Objective Remarks


GENERAL: Well-nourished, well-developed patient.


CARDIOVASCULAR: Regular rate and rhythm without murmurs, gallops, or rubs. 


RESPIRATORY: Breath sounds equal bilaterally. No accessory muscle use.


ABDOMEN/GI: Abdomen soft, non-tender. 


   Fundus: Firm, non-tender at umbilicus.


GENITOURINARY: Light to moderate bleeding.


EXTREMITIES: No cyanosis or edema, non-tender, without signs of DVT.


Medications and IVs





Current Medications








 Medications


  (Trade)  Dose


 Ordered  Sig/Gee


 Route  Start Time


 Stop Time Status Last Admin


 


  (NS Flush)  2 ml  BID


 IV FLUSH  18 09:00


     


 


 


  (NS Flush)  2 ml  UNSCH  PRN


 IV FLUSH  18 03:45


     


 


 


  (Tylenol)  650 mg  Q4H  PRN


 PO  18 03:45


    18 21:16


 


 


  (Motrin)  800 mg  Q8H  PRN


 PO  18 03:45


    18 21:15


 


 


  (Americaine 20%


 Top Spr)  1 spray  Q4H  PRN


 TOPICAL  18 03:45


     


 


 


  (Tucks Pads)  1 applic  QID  PRN


 TOPICAL  18 03:45


     


 


 


  (Zahraa-Colace)  2 tab  Q12H  PRN


 PO  18 03:45


    18 21:15


 


 


  (Ambien)  5 mg  HS  PRN


 PO  18 03:45


     


 


 


  (Mag-Al Plus


 Susp Liq)  15 ml  Q8H  PRN


 PO  18 03:45


     


 


 


  (Zofran  Odt)  4 mg  Q6H  PRN


 PO  18 03:45


     


 


 


  (Toradol Inj)  30 mg  Q6H  PRN


 IM  18 03:45


 18 03:44   


 


 


  (Pepcid)  20 mg  BID


 PO  18 09:00


    18 21:09


 











Assessment/Plan


Problem List:  


(1) Vacuum extraction, delivered, current hospitalization


ICD Codes:  O66.5 - Attempted application of vacuum extractor and forceps


Assessment and Plan


Patient with h/o drug abuse is a 38-year-old  delivered at 38 weeks and 2 

days. Patient is postpartum day 1 after vacuum-assisted vaginal delivery. 

Patient was counseled to do 6 weeks of pelvic rest. Patient was counseled to 

follow up in 6 weeks. Patient requested follow-up and contraception. 





1. s/p vacuum-assisted vaginal delivery


--AF VSS


--Continue routine postpartum care


--Motrin when necessary for pain


--Encourage OOB


--Pelvic rest for 6 weeks will need follow-up appointment at that time. 


--Contraception: Depo-Provera administered


--Anticipate discharge today or tomorrow





2.  Acid reflux


--Famotidine 20 mg p.o. twice daily





3. h/o drug abuse -patient reported that she has been clean for the past 4 

years but was found to have UDS positive for amphetamines





w/d/w Dr. Pastrana


Discharge Planning


--Anticipate discharge tomorrow or the next day











Dayanara Ni MD R1 May 7, 2018 08:28

## 2018-05-07 NOTE — HHI.DCPOC
Discharge Care Plan


Diagnosis:  


(1) Normal vaginal delivery


(2) History of substance abuse


(3) No prenatal care in current pregnancy


Report Symptoms to Your Doctor


-Temperature above 100.5 degrees


-Redness, of incision or excessive or foul smelling drainage


-Unusual pain or calf pain


-Increased vaginal bleeding


-Painful or difficulty urinating


-Feelings of extreme sadness or anxiety after 2 weeks


Goals to Promote Your Health


* To prevent worsening of your condition and complications


* To maintain your health at the optimal level


Directions to Meet Your Goals


*** Take your medications as prescribed


*** Follow your dietary instruction


*** Follow activity as directed


*** Ensure plenty of rest for recovery


*** Drink fluids for hydration








*** Keep your appointments as scheduled


*** Take your immunizations and boosters as scheduled


*** If your symptoms worsen call your PCP, if no PCP go to Urgent Care Center 

or Emergency Room***


*** Smoking is Dangerous to Your Health. Avoid second hand smoke***


***Call the 24-hour crisis hotline for domestic abuse at 1-959.218.6183***











Dayanara Ni MD R1 May 7, 2018 14:32